# Patient Record
Sex: MALE | Employment: FULL TIME | URBAN - METROPOLITAN AREA
[De-identification: names, ages, dates, MRNs, and addresses within clinical notes are randomized per-mention and may not be internally consistent; named-entity substitution may affect disease eponyms.]

---

## 2017-02-03 ENCOUNTER — TELEPHONE (OUTPATIENT)
Dept: SLEEP MEDICINE | Age: 47
End: 2017-02-03

## 2017-02-03 NOTE — TELEPHONE ENCOUNTER
Returned call to patient, Data was reviewed. Download added to patients chart. Pressure - cmH2O Median: 7.3   95th percentile: 9.6    Maximum: 9.9    02/03/2017, 04:19 PM Settings requested by Monik Barroso       Set Mode to AutoSet   Set Min Pressure to 6.0 cmH2O   Set Max Pressure to 12.0 cmH2O   Set EPR type to Fulltime   Set EPR level to 3    Previous Settings   Set EPR level to 3   Set Mode to AutoSet   Set Min Pressure to 6.0 cmH2O   Set Max Pressure to 10.0 cmH2O   Set EPR type to Fulltime      Patient was asked to contact our office for any problems regarding his home pap usage.

## 2017-03-14 ENCOUNTER — OFFICE VISIT (OUTPATIENT)
Dept: CARDIOLOGY CLINIC | Age: 47
End: 2017-03-14

## 2017-03-14 VITALS
HEART RATE: 88 BPM | SYSTOLIC BLOOD PRESSURE: 110 MMHG | RESPIRATION RATE: 18 BRPM | BODY MASS INDEX: 35.36 KG/M2 | WEIGHT: 233.3 LBS | HEIGHT: 68 IN | OXYGEN SATURATION: 96 % | DIASTOLIC BLOOD PRESSURE: 82 MMHG

## 2017-03-14 DIAGNOSIS — M79.605 PAIN IN BOTH LOWER EXTREMITIES: ICD-10-CM

## 2017-03-14 DIAGNOSIS — Z82.49 FAMILY HISTORY OF ISCHEMIC HEART DISEASE: ICD-10-CM

## 2017-03-14 DIAGNOSIS — M79.604 PAIN IN BOTH LOWER EXTREMITIES: ICD-10-CM

## 2017-03-14 DIAGNOSIS — E78.2 MIXED HYPERLIPIDEMIA: ICD-10-CM

## 2017-03-14 DIAGNOSIS — M79.10 MYALGIA: ICD-10-CM

## 2017-03-14 DIAGNOSIS — R06.09 DOE (DYSPNEA ON EXERTION): Primary | ICD-10-CM

## 2017-03-14 DIAGNOSIS — R53.83 FATIGUE, UNSPECIFIED TYPE: ICD-10-CM

## 2017-03-14 NOTE — MR AVS SNAPSHOT
Visit Information Date & Time Provider Department Dept. Phone Encounter #  
 3/14/2017  4:15 PM Kristyn Dunbar, [address] [facility] [phone] Upcoming Health Maintenance Date Due DTaP/Tdap/Td series (1 - Tdap) 5/27/1990 INFLUENZA AGE 9 TO ADULT 8/1/2016 Allergies as of 3/14/2017  Review Complete On: 3/14/2017 By: Kristyn Dunbar MD  
 No Known Allergies Current Immunizations  Never Reviewed No immunizations on file. Not reviewed this visit You Were Diagnosed With   
  
 Codes Comments WINTERS (dyspnea on exertion)    -  Primary ICD-10-CM: R06.09 
ICD-9-CM: 786.09 Mixed hyperlipidemia     ICD-10-CM: E78.2 ICD-9-CM: 272.2 Family history of ischemic heart disease     ICD-10-CM: Z82.49 
ICD-9-CM: V17.3 Pain in both lower extremities     ICD-10-CM: M79.604, M79.605 ICD-9-CM: 729.5 Fatigue, unspecified type     ICD-10-CM: R53.83 ICD-9-CM: 780.79 Myalgia     ICD-10-CM: M79.1 ICD-9-CM: 729.1 Vitals BP Pulse Resp Height(growth percentile) Weight(growth percentile) SpO2  
 110/82 (BP 1 Location: Left arm, BP Patient Position: Sitting) 88 18 5' 8\" (1.727 m) 233 lb 4.8 oz (105.8 kg) 96% BMI Smoking Status 35.47 kg/m2 Former Smoker Vitals History BMI and BSA Data Body Mass Index Body Surface Area  
 35.47 kg/m 2 2.25 m 2 Preferred Pharmacy Pharmacy Name Phone [facility] [address] [facility], [address] [facility] AT [address] [place] [address], [facility] [phone] Your Updated Medication List  
  
   
This list is accurate as of: 3/14/17  5:12 PM.  Always use your most recent med list.  
  
  
  
  
 AFRIN EXTRA MOISTURIZING NA  
by Nasal route two (2) times a day. AMBIEN 10 mg tablet Generic drug:  zolpidem Take 5 mg by mouth nightly as needed. amoxicillin-clavulanate 875-125 mg per tablet Commonly known as:  AUGMENTIN Take 1 Tab by mouth two (2) times a day. CO Q-10 200 mg capsule Generic drug:  coenzyme Q-10 Take  by mouth daily. FISH  mg Cap Generic drug:  Omega-3 Fatty Acids Take 500 mg by mouth. JOHANN RED  
  
 ketorolac 10 mg tablet Commonly known as:  TORADOL Take 1 Tab by mouth every six (6) hours as needed for Pain. methocarbamol 500 mg tablet Commonly known as:  ROBAXIN Take 1 Tab by mouth two (2) times daily as needed for Pain. MOTRIN PO Take 600 mg by mouth as needed. rosuvastatin 20 mg tablet Commonly known as:  CRESTOR Take 1 tablet by mouth nightly. ZyrTEC 10 mg tablet Generic drug:  cetirizine Take  by mouth daily. We Performed the Following CBC WITH AUTOMATED DIFF [25940 CPT(R)] CK [id number] CPT(R)] D DIMER [id number] CPT(R)] LIPID PANEL [46444 CPT(R)] MAGNESIUM [id number] CPT(R)] METABOLIC PANEL, COMPREHENSIVE [18736 CPT(R)] SED RATE (ESR) [id number] CPT(R)] TESTOSTERONE, FREE & TOTAL [90692 CPT(R)] THYROID PANEL W/TSH [00212 CPT(R)] VITAMIN B12 & FOLATE [06197 CPT(R)] To-Do List   
 03/15/2017 Imaging:  ANKLE BRACHIAL INDEX   
  
 03/15/2017 ECHO:  ECHO TTE STRESS EXRCSE COMP W OR WO CONTR   
  
 03/15/2017 Lab:  VITAMIN D, 1, 25 DIHYDROXY   
  
 03/15/2017 Imaging:  XR CHEST PA LAT   
  
 03/16/2017 ECHO:  2D ECHO COMPLETE ADULT (TTE) W OR WO CONTR Introducing [facility]! [facility] introduces [employer] patient portal. Now you can access parts of your medical record, email your doctor's office, and request medication refills online. 1. In your internet browser, go to [email or web address] 2. Click on the First Time User? Click Here link in the Sign In box. You will see the New Member Sign Up page. 3. Enter your [employer] Access Code exactly as it appears below.  You will not need to use this code after youve completed the sign-up process. If you do not sign up before the expiration date, you must request a new code. · [employer] Access Code: [email or web address] Expires: 6/12/2017  4:26 PM 
 
4. Enter the last four digits of your Social Security Number (xxxx) and Date of Birth (mm/dd/yyyy) as indicated and click Submit. You will be taken to the next sign-up page. 5. Create a [employer] ID. This will be your [employer] login ID and cannot be changed, so think of one that is secure and easy to remember. 6. Create a [employer] password. You can change your password at any time. 7. Enter your Password Reset Question and Answer. This can be used at a later time if you forget your password. 8. Enter your e-mail address. You will receive e-mail notification when new information is available in [address] 9. Click Sign Up. You can now view and download portions of your medical record. 10. Click the Download Summary menu link to download a portable copy of your medical information. If you have questions, please visit the Frequently Asked Questions section of the [employer] website. Remember, [employer] is NOT to be used for urgent needs. For medical emergencies, dial 911. Now available from your iPhone and Android! Please provide this summary of care documentation to your next provider. Your primary care clinician is listed as [address]. If you have any questions after today's visit, please call [phone].

## 2017-03-14 NOTE — PROGRESS NOTES
Anusha Height NP  Subjective/HPI:     Sebastien Kirkpatrick is a 51 y.o. male is here for vascular consult for persistent leg ache, has been on > 1 year statin holiday without improvement. Also reporting WINTERS with minimal activities such as taking a shower and occupational activities which upwards of a few months ago had no difficulty. Denies exertional chest \"pain\" but admits to periods of chest tightness when over exerted and feeling short of breath. Denies hx of coagulopathy/pe or DVT. s    PCP Provider  Darrian Collins MD  Past Medical History:   Diagnosis Date    Chronic pain     back pain since 2011    Hyperlipemia     Unspecified sleep apnea       Past Surgical History:   Procedure Laterality Date    HX HEENT      PE Tubes    HX TONSILLECTOMY       No Known Allergies   Family History   Problem Relation Age of Onset    Heart Disease Father     Cancer Father     Diabetes Father       Current Outpatient Prescriptions   Medication Sig    IBUPROFEN (MOTRIN PO) Take 600 mg by mouth as needed.  amoxicillin-clavulanate (AUGMENTIN) 875-125 mg per tablet Take 1 Tab by mouth two (2) times a day.  ketorolac (TORADOL) 10 mg tablet Take 1 Tab by mouth every six (6) hours as needed for Pain.  methocarbamol (ROBAXIN) 500 mg tablet Take 1 Tab by mouth two (2) times daily as needed for Pain.  rosuvastatin (CRESTOR) 20 mg tablet Take 1 tablet by mouth nightly.  OXYMETAZOLINE HCL (AFRIN EXTRA MOISTURIZING NA) by Nasal route two (2) times a day.  cetirizine (ZYRTEC) 10 mg tablet Take  by mouth daily.  coenzyme Q-10 (CO Q-10) 200 mg capsule Take  by mouth daily.  Omega-3 Fatty Acids (FISH OIL) 300 mg cap Take 500 mg by mouth. JOHANN RED    zolpidem (AMBIEN) 10 mg tablet Take 5 mg by mouth nightly as needed. No current facility-administered medications for this visit.        Vitals:    03/14/17 1627   BP: 110/82   Pulse: 88   Resp: 18   SpO2: 96%   Weight: 233 lb 4.8 oz (105.8 kg)   Height: 5' 8\" (1.727 m)     Social History     Social History    Marital status:      Spouse name: N/A    Number of children: N/A    Years of education: N/A     Occupational History    Not on file. Social History Main Topics    Smoking status: Former Smoker     Quit date: 5/8/2003    Smokeless tobacco: Not on file    Alcohol use No    Drug use: No    Sexual activity: Not on file     Other Topics Concern    Not on file     Social History Narrative       I have reviewed the nurses notes, vitals, problem list, allergy list, medical history, family, social history and medications. Review of Symptoms:    General: Pt denies excessive weight gain or loss. Pt is able to conduct ADL's  HEENT: Denies blurred vision, headaches, epistaxis and difficulty swallowing. Respiratory: Denies shortness of breath, +WINTERS, no wheezing or stridor. Cardiovascular: Denies precordial pain, palpitations, edema or PND  Gastrointestinal: Denies poor appetite, indigestion, abdominal pain or blood in stool  Musculoskeletal: + myalgias no arthralgias. Neurologic: Denies tremor, paresthesias, or sensory motor disturbance  Skin: Denies rash, itching or texture change. Physical Exam:      General: Well developed, in no acute distress, cooperative and alert  HEENT: No carotid bruits, no JVD, trach is midline. Neck Supple, PEERL, EOM intact. Heart:  Normal S1/S2 negative S3 or S4. Regular, no murmur, gallop or rub.   Respiratory: Clear bilaterally x 4, no wheezing or rales  Abdomen:   Soft, non-tender, no masses, bowel sounds are active.   Extremities:  No edema, normal cap refill, no cyanosis, atraumatic. Neuro: A&Ox3, speech clear, gait stable. Skin: Skin color is normal. No rashes or lesions. Non diaphoretic  Vascular: 2+ pulses symmetric in all extremities    Cardiographics    ECG:   No results found for this or any previous visit.       Cardiology Labs:  Lab Results   Component Value Date/Time    Cholesterol, total 294 09/02/2014 10:48 AM    HDL Cholesterol 36 09/02/2014 10:48 AM    LDL, calculated 221 09/02/2014 10:48 AM    Triglyceride 184 09/02/2014 10:48 AM       Lab Results   Component Value Date/Time    Sodium 140 09/02/2014 10:48 AM    Potassium 4.9 09/02/2014 10:48 AM    Chloride 102 09/02/2014 10:48 AM    CO2 26 09/02/2014 10:48 AM    Glucose 90 09/02/2014 10:48 AM    BUN 21 09/02/2014 10:48 AM    Creatinine 0.95 09/02/2014 10:48 AM    BUN/Creatinine ratio 22 09/02/2014 10:48 AM    GFR est  09/02/2014 10:48 AM    GFR est non-AA 96 09/02/2014 10:48 AM    Calcium 9.5 09/02/2014 10:48 AM    Bilirubin, total 1.2 09/02/2014 10:48 AM    AST (SGOT) 16 09/02/2014 10:48 AM    Alk. phosphatase 65 09/02/2014 10:48 AM    Protein, total 6.6 09/02/2014 10:48 AM    Albumin 4.5 09/02/2014 10:48 AM    A-G Ratio 2.1 09/02/2014 10:48 AM    ALT (SGPT) 27 09/02/2014 10:48 AM           Assessment:     Assessment:     Lynn North was seen today for other. Diagnoses and all orders for this visit:    WINTERS (dyspnea on exertion)  -     METABOLIC PANEL, COMPREHENSIVE  -     MAGNESIUM  -     THYROID PANEL W/TSH  -     LIPID PANEL  -     CBC WITH AUTOMATED DIFF  -     VITAMIN D, 1, 25 DIHYDROXY; Future  -     VITAMIN B12 & FOLATE  -     TESTOSTERONE, FREE & TOTAL  -     CK  -     2D ECHO COMPLETE ADULT (TTE) W OR WO CONTR; Future  -     ECHO TTE STRESS EXRCSE COMP W OR WO CONTR; Future  -     XR CHEST PA LAT; Future  -     SED RATE (ESR)    Mixed hyperlipidemia  -     METABOLIC PANEL, COMPREHENSIVE  -     MAGNESIUM  -     THYROID PANEL W/TSH  -     LIPID PANEL  -     CBC WITH AUTOMATED DIFF  -     VITAMIN D, 1, 25 DIHYDROXY; Future  -     VITAMIN B12 & FOLATE  -     TESTOSTERONE, FREE & TOTAL  -     CK  -     2D ECHO COMPLETE ADULT (TTE) W OR WO CONTR; Future  -     ECHO TTE STRESS EXRCSE COMP W OR WO CONTR; Future  -     XR CHEST PA LAT;  Future  -     SED RATE (ESR)    Family history of ischemic heart disease  -     METABOLIC PANEL, COMPREHENSIVE  -     MAGNESIUM  -     THYROID PANEL W/TSH  -     LIPID PANEL  -     CBC WITH AUTOMATED DIFF  -     VITAMIN D, 1, 25 DIHYDROXY; Future  -     VITAMIN B12 & FOLATE  -     TESTOSTERONE, FREE & TOTAL  -     CK  -     2D ECHO COMPLETE ADULT (TTE) W OR WO CONTR; Future  -     ECHO TTE STRESS EXRCSE COMP W OR WO CONTR; Future  -     XR CHEST PA LAT; Future  -     SED RATE (ESR)    Pain in both lower extremities  -     METABOLIC PANEL, COMPREHENSIVE  -     MAGNESIUM  -     THYROID PANEL W/TSH  -     LIPID PANEL  -     CBC WITH AUTOMATED DIFF  -     VITAMIN D, 1, 25 DIHYDROXY; Future  -     VITAMIN B12 & FOLATE  -     TESTOSTERONE, FREE & TOTAL  -     CK  -     2D ECHO COMPLETE ADULT (TTE) W OR WO CONTR; Future  -     ECHO TTE STRESS EXRCSE COMP W OR WO CONTR; Future  -     XR CHEST PA LAT; Future  -     SED RATE (ESR)    Fatigue, unspecified type  -     METABOLIC PANEL, COMPREHENSIVE  -     MAGNESIUM  -     THYROID PANEL W/TSH  -     LIPID PANEL  -     CBC WITH AUTOMATED DIFF  -     VITAMIN D, 1, 25 DIHYDROXY; Future  -     VITAMIN B12 & FOLATE  -     TESTOSTERONE, FREE & TOTAL  -     CK  -     2D ECHO COMPLETE ADULT (TTE) W OR WO CONTR; Future  -     ECHO TTE STRESS EXRCSE COMP W OR WO CONTR; Future  -     XR CHEST PA LAT; Future  -     SED RATE (ESR)    Myalgia        ICD-10-CM ICD-9-CM    1. WINTERS (dyspnea on exertion) U75.01 908.36 METABOLIC PANEL, COMPREHENSIVE      MAGNESIUM      THYROID PANEL W/TSH      LIPID PANEL      CBC WITH AUTOMATED DIFF      VITAMIN D, 1, 25 DIHYDROXY      VITAMIN B12 & FOLATE      TESTOSTERONE, FREE & TOTAL      CK      2D ECHO COMPLETE ADULT (TTE) W OR WO CONTR      ECHO TTE STRESS EXRCSE COMP W OR WO CONTR      XR CHEST PA LAT      SED RATE (ESR)   2.  Mixed hyperlipidemia F26.3 897.5 METABOLIC PANEL, COMPREHENSIVE      MAGNESIUM      THYROID PANEL W/TSH      LIPID PANEL      CBC WITH AUTOMATED DIFF      VITAMIN D, 1, 25 DIHYDROXY      VITAMIN B12 & FOLATE      TESTOSTERONE, FREE & TOTAL      CK      2D ECHO COMPLETE ADULT (TTE) W OR WO CONTR      ECHO TTE STRESS EXRCSE COMP W OR WO CONTR      XR CHEST PA LAT      SED RATE (ESR)   3. Family history of ischemic heart disease C23.78 L12.1 METABOLIC PANEL, COMPREHENSIVE      MAGNESIUM      THYROID PANEL W/TSH      LIPID PANEL      CBC WITH AUTOMATED DIFF      VITAMIN D, 1, 25 DIHYDROXY      VITAMIN B12 & FOLATE      TESTOSTERONE, FREE & TOTAL      CK      2D ECHO COMPLETE ADULT (TTE) W OR WO CONTR      ECHO TTE STRESS EXRCSE COMP W OR WO CONTR      XR CHEST PA LAT      SED RATE (ESR)   4. Pain in both lower extremities Z72.891 126.4 METABOLIC PANEL, COMPREHENSIVE    M79.605  MAGNESIUM      THYROID PANEL W/TSH      LIPID PANEL      CBC WITH AUTOMATED DIFF      VITAMIN D, 1, 25 DIHYDROXY      VITAMIN B12 & FOLATE      TESTOSTERONE, FREE & TOTAL      CK      2D ECHO COMPLETE ADULT (TTE) W OR WO CONTR      ECHO TTE STRESS EXRCSE COMP W OR WO CONTR      XR CHEST PA LAT      SED RATE (ESR)   5. Fatigue, unspecified type L39.28 479.23 METABOLIC PANEL, COMPREHENSIVE      MAGNESIUM      THYROID PANEL W/TSH      LIPID PANEL      CBC WITH AUTOMATED DIFF      VITAMIN D, 1, 25 DIHYDROXY      VITAMIN B12 & FOLATE      TESTOSTERONE, FREE & TOTAL      CK      2D ECHO COMPLETE ADULT (TTE) W OR WO CONTR      ECHO TTE STRESS EXRCSE COMP W OR WO CONTR      XR CHEST PA LAT      SED RATE (ESR)   6. Myalgia M79.1 729.1      Orders Placed This Encounter    XR CHEST PA LAT     Standing Status:   Future     Standing Expiration Date:   4/14/2018     Order Specific Question:   Reason for Exam     Answer:   WINTERS     Order Specific Question:   Is Patient Allergic to Contrast Dye?      Answer:   Unknown    METABOLIC PANEL, COMPREHENSIVE    MAGNESIUM    THYROID PANEL W/TSH    LIPID PANEL    CBC WITH AUTOMATED DIFF    VITAMIN D, 1, 25 DIHYDROXY     Standing Status:   Future     Standing Expiration Date:   6/15/2017    VITAMIN B12 & FOLATE    TESTOSTERONE, FREE & TOTAL    CK    SED RATE (ESR)    2D ECHO COMPLETE ADULT (TTE) W OR WO CONTR     Standing Status:   Future     Standing Expiration Date:   9/14/2017     Order Specific Question:   Reason for Exam:     Answer:   WINTERS     Order Specific Question:   Contrast Enhancement (Bubble Study, Definity, Optison) may be used if criteria listed in established evidence-based protocol has been identified. Answer: Yes    ECHO TTE STRESS EXRCSE COMP W OR WO CONTR     Standing Status:   Future     Standing Expiration Date:   9/14/2017     Order Specific Question:   Reason for Exam:     Answer:   WINTERS and fatigue     Order Specific Question:   Contrast Enhancement (Bubble Study, Definity, Optison) may be used if criteria listed in established evidence-based protocol has been identified. Answer:   Yes        Plan:     GENERAL CARDIOLOGY: Patient presents with progressive WINTERS, fatigue and diffuse myalgias. CAD risk factors include Male/High chol/FMH. Will evaluate with ECHO and Stress echo. CXR as hx of HVAC employment. Follow up with Dr Kiran Rice. Sed rate to evaluate for inflammatory / vasculitis pathology. Fatigue: CBC, CMP, thyroid, and T levels. With WINTERS there is a low WELLS criteria, D dimer r/o embolism. Follow up with Dr Kiran Rice when testing complete. VASCULAR: Low suspect for PAD, ERIBERTO to evaluate. If negative no need for vascular follow up. Amy Delatorre, DORI       Pt seen and examined in details. Agree with NP A&P. D/w pt in details.      Kasi Canada MD

## 2017-03-15 DIAGNOSIS — R06.09 DOE (DYSPNEA ON EXERTION): ICD-10-CM

## 2017-03-15 DIAGNOSIS — R53.83 FATIGUE, UNSPECIFIED TYPE: ICD-10-CM

## 2017-03-15 DIAGNOSIS — E78.2 MIXED HYPERLIPIDEMIA: ICD-10-CM

## 2017-03-15 DIAGNOSIS — M79.604 PAIN IN BOTH LOWER EXTREMITIES: ICD-10-CM

## 2017-03-15 DIAGNOSIS — M79.605 PAIN IN BOTH LOWER EXTREMITIES: ICD-10-CM

## 2017-03-15 DIAGNOSIS — Z82.49 FAMILY HISTORY OF ISCHEMIC HEART DISEASE: ICD-10-CM

## 2017-03-27 ENCOUNTER — CLINICAL SUPPORT (OUTPATIENT)
Dept: CARDIOLOGY CLINIC | Age: 47
End: 2017-03-27

## 2017-03-27 VITALS — HEIGHT: 75 IN

## 2017-03-27 DIAGNOSIS — E78.2 MIXED HYPERLIPIDEMIA: ICD-10-CM

## 2017-03-27 DIAGNOSIS — R53.83 FATIGUE, UNSPECIFIED TYPE: ICD-10-CM

## 2017-03-27 DIAGNOSIS — R06.09 DOE (DYSPNEA ON EXERTION): ICD-10-CM

## 2017-03-27 DIAGNOSIS — M79.604 PAIN IN BOTH LOWER EXTREMITIES: ICD-10-CM

## 2017-03-27 DIAGNOSIS — Z82.49 FAMILY HISTORY OF ISCHEMIC HEART DISEASE: ICD-10-CM

## 2017-03-27 DIAGNOSIS — M79.605 PAIN IN BOTH LOWER EXTREMITIES: ICD-10-CM

## 2017-03-28 ENCOUNTER — TELEPHONE (OUTPATIENT)
Dept: CARDIOLOGY CLINIC | Age: 47
End: 2017-03-28

## 2017-03-28 ENCOUNTER — CLINICAL SUPPORT (OUTPATIENT)
Dept: CARDIOLOGY CLINIC | Age: 47
End: 2017-03-28

## 2017-03-28 DIAGNOSIS — R06.09 DOE (DYSPNEA ON EXERTION): ICD-10-CM

## 2017-03-28 DIAGNOSIS — Z82.49 FAMILY HISTORY OF ISCHEMIC HEART DISEASE: ICD-10-CM

## 2017-03-28 DIAGNOSIS — M79.604 PAIN IN BOTH LOWER EXTREMITIES: ICD-10-CM

## 2017-03-28 DIAGNOSIS — E78.2 MIXED HYPERLIPIDEMIA: ICD-10-CM

## 2017-03-28 DIAGNOSIS — R53.83 FATIGUE, UNSPECIFIED TYPE: ICD-10-CM

## 2017-03-28 DIAGNOSIS — M79.605 PAIN IN BOTH LOWER EXTREMITIES: ICD-10-CM

## 2017-03-28 NOTE — TELEPHONE ENCOUNTER
----- Message from Bailey Flood NP sent at 3/28/2017  4:25 PM EDT -----  Isabelle Suwanee: Please call patient, stress echo was normal.  I am still waiting for his lab results.

## 2017-03-28 NOTE — PROGRESS NOTES
Chauncey Delgado: Please call patient, stress echo was normal.  I am still waiting for his lab results.

## 2017-03-29 NOTE — TELEPHONE ENCOUNTER
----- Message from Pam Marie NP sent at 3/28/2017  4:25 PM EDT -----  Gm: Please call patient, stress echo was normal.  I am still waiting for his lab results.

## 2017-03-30 ENCOUNTER — TELEPHONE (OUTPATIENT)
Dept: CARDIOLOGY CLINIC | Age: 47
End: 2017-03-30

## 2017-03-30 NOTE — PROGRESS NOTES
uDlce Maria Guardian, please call patient echocardiogram finds very minimal leaking valves, normal pumping range of heart. Follow-up with Dr. Jennifer Allen as planned.

## 2017-03-31 ENCOUNTER — TELEPHONE (OUTPATIENT)
Dept: CARDIOLOGY CLINIC | Age: 47
End: 2017-03-31

## 2017-03-31 NOTE — TELEPHONE ENCOUNTER
----- Message from ditloDORI sent at 3/30/2017  4:58 PM EDT -----  Adelaida Francois, please call patient echocardiogram finds very minimal leaking valves, normal pumping range of heart. Follow-up with Dr. Kelly Edwards as planned.

## 2017-04-03 NOTE — TELEPHONE ENCOUNTER
Verified patient with two identifiers. Spoke with patient regarding ECHO test results. Meme Meyers, Pt needs an appt for ERIBERTO and a follow appt with Dr. Mae Woodson.   Thanks

## 2017-04-19 LAB
25(OH)D3+25(OH)D2 SERPL-MCNC: 26.3 NG/ML (ref 30–100)
D DIMER PPP FEU-MCNC: 0.3 MG/L FEU (ref 0–0.49)

## 2017-04-20 ENCOUNTER — TELEPHONE (OUTPATIENT)
Dept: CARDIOLOGY CLINIC | Age: 47
End: 2017-04-20

## 2017-04-20 DIAGNOSIS — E78.2 MIXED HYPERLIPIDEMIA: Primary | ICD-10-CM

## 2017-04-20 DIAGNOSIS — Z82.49 FAMILY HISTORY OF ISCHEMIC HEART DISEASE: ICD-10-CM

## 2017-04-20 LAB
ALBUMIN SERPL-MCNC: 4.3 G/DL (ref 3.5–5.5)
ALBUMIN/GLOB SERPL: 1.7 {RATIO} (ref 1.2–2.2)
ALP SERPL-CCNC: 68 IU/L (ref 39–117)
ALT SERPL-CCNC: 21 IU/L (ref 0–44)
AST SERPL-CCNC: 17 IU/L (ref 0–40)
BASOPHILS # BLD AUTO: 0 X10E3/UL (ref 0–0.2)
BASOPHILS NFR BLD AUTO: 1 %
BILIRUB SERPL-MCNC: 0.6 MG/DL (ref 0–1.2)
BUN SERPL-MCNC: 18 MG/DL (ref 6–24)
BUN/CREAT SERPL: 18 (ref 9–20)
CALCIUM SERPL-MCNC: 9.5 MG/DL (ref 8.7–10.2)
CHLORIDE SERPL-SCNC: 100 MMOL/L (ref 96–106)
CHOLEST SERPL-MCNC: 313 MG/DL (ref 100–199)
CK SERPL-CCNC: 160 U/L (ref 24–204)
CO2 SERPL-SCNC: 25 MMOL/L (ref 18–29)
COMMENT: NORMAL
CREAT SERPL-MCNC: 1.01 MG/DL (ref 0.76–1.27)
EOSINOPHIL # BLD AUTO: 0.1 X10E3/UL (ref 0–0.4)
EOSINOPHIL NFR BLD AUTO: 3 %
ERYTHROCYTE [DISTWIDTH] IN BLOOD BY AUTOMATED COUNT: 13.4 % (ref 12.3–15.4)
ERYTHROCYTE [SEDIMENTATION RATE] IN BLOOD BY WESTERGREN METHOD: 2 MM/HR (ref 0–15)
FOLATE SERPL-MCNC: 14.2 NG/ML
FT4I SERPL CALC-MCNC: 2.4 (ref 1.2–4.9)
GLOBULIN SER CALC-MCNC: 2.6 G/DL (ref 1.5–4.5)
GLUCOSE SERPL-MCNC: 101 MG/DL (ref 65–99)
HCT VFR BLD AUTO: 42.9 % (ref 37.5–51)
HDLC SERPL-MCNC: 30 MG/DL
HGB BLD-MCNC: 14.8 G/DL (ref 12.6–17.7)
IMM GRANULOCYTES # BLD: 0 X10E3/UL (ref 0–0.1)
IMM GRANULOCYTES NFR BLD: 0 %
INTERPRETATION, 910389: NORMAL
LDLC SERPL CALC-MCNC: ABNORMAL MG/DL (ref 0–99)
LYMPHOCYTES # BLD AUTO: 1.8 X10E3/UL (ref 0.7–3.1)
LYMPHOCYTES NFR BLD AUTO: 34 %
MAGNESIUM SERPL-MCNC: 1.8 MG/DL (ref 1.6–2.3)
MCH RBC QN AUTO: 32.3 PG (ref 26.6–33)
MCHC RBC AUTO-ENTMCNC: 34.5 G/DL (ref 31.5–35.7)
MCV RBC AUTO: 94 FL (ref 79–97)
MONOCYTES # BLD AUTO: 0.5 X10E3/UL (ref 0.1–0.9)
MONOCYTES NFR BLD AUTO: 9 %
NEUTROPHILS # BLD AUTO: 2.9 X10E3/UL (ref 1.4–7)
NEUTROPHILS NFR BLD AUTO: 53 %
PLATELET # BLD AUTO: 251 X10E3/UL (ref 150–379)
POTASSIUM SERPL-SCNC: 4.5 MMOL/L (ref 3.5–5.2)
PROT SERPL-MCNC: 6.9 G/DL (ref 6–8.5)
RBC # BLD AUTO: 4.58 X10E6/UL (ref 4.14–5.8)
SODIUM SERPL-SCNC: 139 MMOL/L (ref 134–144)
T3RU NFR SERPL: 31 % (ref 24–39)
T4 SERPL-MCNC: 7.8 UG/DL (ref 4.5–12)
TESTOST FREE SERPL-MCNC: 12.5 PG/ML (ref 6.8–21.5)
TESTOST SERPL-MCNC: 435 NG/DL (ref 348–1197)
TRIGL SERPL-MCNC: 409 MG/DL (ref 0–149)
TSH SERPL DL<=0.005 MIU/L-ACNC: 2.88 UIU/ML (ref 0.45–4.5)
VIT B12 SERPL-MCNC: 508 PG/ML (ref 211–946)
VLDLC SERPL CALC-MCNC: ABNORMAL MG/DL (ref 5–40)
WBC # BLD AUTO: 5.4 X10E3/UL (ref 3.4–10.8)

## 2017-04-20 NOTE — PROGRESS NOTES
Spoke with patient's wife Shanna(verified HIPPA)  Verified patient with 2 patient identifier  Informed per Jose Justice NP labs OK other than lipid panel. Wife confirmed  hasn't taken Crestor in a year. Informed Dilip NP would like him to resume Crestor.  Start with 20mg x 1 month then increase to 40mg then after. Repeat CMP, CK, Lipids and direct LDL in 2 Months.  I anticipate this patient will require PSK9 therapy. Wife says  would like to try red yeast rice and OTC crestoff.  Says will have labs done in 2 months

## 2017-04-20 NOTE — PROGRESS NOTES
Please call patient labs OK other than lipid panel. He has been on a statin holiday, I would like him to resume Crestor. Start with 20mg x 1 month then increase to 40mg then after. Repeat CMP, CK, Lipids and direct LDL in 2 Months.   I anticipate this patient will require PSK9 therapy

## 2017-04-20 NOTE — TELEPHONE ENCOUNTER
Spoke with patient's wife Shanna(verified HIPPA)  Verified patient with 2 patient identifier  Informed per Raul Bates NP labs OK other than lipid panel. Wife confirmed  hasn't taken Crestor in a year. Informed Dilip MEADOWS would like him to resume Crestor.  Start with 20mg x 1 month then increase to 40mg then after. Repeat CMP, CK, Lipids and direct LDL in 2 Months.  I anticipate this patient will require PSK9 therapy. Wife says  would like to try red yeast rice and OTC crestoff.  Says will have labs done in 2 months

## 2017-04-20 NOTE — PROGRESS NOTES
Spoke with patient's wife Shanna(verified HIPPA)  Verified patient with 2 patient identifier  Informed per Montel Kayser NP VIT d came as a separate result, it is low. Start OTC VitD3 2000units oral daily.  ( d3 is the liquid gel capsul which has better absorption)

## 2017-04-20 NOTE — TELEPHONE ENCOUNTER
----- Message from Thi Hernandez NP sent at 4/20/2017  8:59 AM EDT -----  Please call patient labs OK other than lipid panel. He has been on a statin holiday, I would like him to resume Crestor. Start with 20mg x 1 month then increase to 40mg then after. Repeat CMP, CK, Lipids and direct LDL in 2 Months.   I anticipate this patient will require PSK9 therapy

## 2017-04-20 NOTE — PROGRESS NOTES
VIT d came as a separate result, it is low. Start OTC VitD3 2000units oral daily.  ( d3 is the liquid gel capsul which has better absorption)

## 2017-04-26 ENCOUNTER — TELEPHONE (OUTPATIENT)
Dept: SLEEP MEDICINE | Age: 47
End: 2017-04-26

## 2017-04-26 DIAGNOSIS — G47.33 OSA (OBSTRUCTIVE SLEEP APNEA): Primary | ICD-10-CM

## 2017-04-26 NOTE — TELEPHONE ENCOUNTER
Orders Placed This Encounter    AMB SUPPLY ORDER     Diagnosis: (G47.33) ABUNDIO (obstructive sleep apnea)  (primary encounter diagnosis)      Oral/Nasal Combo Mask 1 every 3 months.  Oral Cushion Combo Mask (Replace) 2 per month.  Nasal Pillows Combo Mask (Replace) 2 per month.  Full Face Mask 1 every 3 months.  Full Face Mask Cushion 1 per month.  Nasal Cushion (Replace) 2 per month.  Nasal Pillows (Replace) 2 per month.  Nasal Interface Mask 1 every 3 months.  Headgear 1 every 6 months.  Chinstrap 1 every 6 months.  Tubing 1 every 3 months.  Filter(s) Disposable 2 per month.  Filter(s) Non-Disposable 1 every 6 months.  Oral Interface 1 every 3 months. [facility] for [employer] (Replace) 1 every 6 months.  Tubing with heating element 1 every 3 months. Perform Mask Fitting per patient preference and comfort - replace as above. Rhonda Hardin MD, FAA; NPI: [id number]    Electronically signed. Date:- 4-26-17.

## 2017-04-26 NOTE — TELEPHONE ENCOUNTER
Patient called requesting a Rx for new PAP supplies. His mask has broken and he needs a new tube. He was last sen in 2015.  He has scheduled a f/u for the next available appt, which is May 24th

## 2017-05-31 ENCOUNTER — OFFICE VISIT (OUTPATIENT)
Dept: SLEEP MEDICINE | Age: 47
End: 2017-05-31

## 2017-05-31 ENCOUNTER — DOCUMENTATION ONLY (OUTPATIENT)
Dept: SLEEP MEDICINE | Age: 47
End: 2017-05-31

## 2017-05-31 VITALS
WEIGHT: 235 LBS | HEART RATE: 81 BPM | BODY MASS INDEX: 29.22 KG/M2 | SYSTOLIC BLOOD PRESSURE: 121 MMHG | OXYGEN SATURATION: 97 % | TEMPERATURE: 97.6 F | HEIGHT: 75 IN | DIASTOLIC BLOOD PRESSURE: 74 MMHG

## 2017-05-31 DIAGNOSIS — G47.33 OSA (OBSTRUCTIVE SLEEP APNEA): Primary | ICD-10-CM

## 2017-05-31 NOTE — PROGRESS NOTES
PAP supply order and clinical notes were faxed to [facility] on 5/31/17

## 2017-05-31 NOTE — PROGRESS NOTES
217 [facility]. [place], [address]  Tel.  [phone]  Fax. 100 [facility] 60  [place], [address] [facility]  Tel.  [phone]  Fax. [phone] [facility]4 [place] [address] [address]   Tel.  [phone]  Fax. [phone]     S>Rebel Gann is a 49 y.o. male seen for a positive airway pressure follow-up. He reports no problems using the device. The following problems are identified:    Drowsiness no Problems exhaling no   Snoring no Forget to put on no   Mask Comfortable yes Can't fall asleep no   Dry Mouth no Mask falls off no   Air Leaking no Frequent awakenings yes       He admits that his sleep has improved. Therapy Apnea Index averaged over PAP use: 2.0 /hr which reflects improved sleep breathing condition. He reports that he feels that he may not be getting enough pressure and tends to wake up frequently through the night. Typically the awakening occur at 2 am, 4 am and then at 5 or 6 am.  He uses the restroom following the awakening. He feels his mind is awake while he sleeps. No Known Allergies    He has a current medication list which includes the following prescription(s): methocarbamol, oxymetazoline hcl, coenzyme q-10, omega-3 fatty acids, amoxicillin-clavulanate, ketorolac, rosuvastatin, ibuprofen, cetirizine, and zolpidem. Anca Line He  has a past medical history of Chronic pain; Hyperlipemia; and Unspecified sleep apnea. [place] Sleepiness Score: 6   and Modified F.O.S.Q. Score Total / 2: 16   which reflect improved sleep quality over therapy time. O>    Visit Vitals    /74    Pulse 81    Temp 97.6 °F (36.4 °C)    Ht 6' 3\" (1.905 m)    Wt 235 lb (106.6 kg)    SpO2 97%    BMI 29.37 kg/m2           General:   Alert, oriented, not in distress   Neck:   No JVD    Chest/Lungs:  symetrical lung expansion , no accessory muscle use    Extremities:  no obvious rashes , negative edema    Neuro:  No focal deficits ;  No obvious tremor Psych:  Normal affect ,  Normal countenance ;           A>    ICD-10-CM ICD-9-CM    1. ABUNDIO (obstructive sleep apnea) G47.33 327.23 SLEEP LAB (PAP TITRATION)   2. BMI 29.0-29.9,adult Z68.29 V85.25      AHI = 7.1. On CPAP : 6-12 cmH2O. Compliant:      yes    Therapeutic Response:  Negative    P>  * Device pressure change to CPAP  10 cmH2O. * Titration ordered to assess nocturnal awakening and optimal pressures. * We have recommended a dedicated weight loss and exercise regiment as significant weight reduction has been shown to reduce severity of obstructive sleep apnea. * Follow-up Disposition:  Return in about 1 year (around 5/31/2018), or if symptoms worsen or fail to improve. * He was asked to contact our office for any problems regarding PAP therapy. * Counseling was provided regarding the importance of regular PAP use and on proper sleep hygiene and safe driving. * Re-enforced proper and regular cleaning for the device. Thank you for allowing us to participate in your patient's medical care.

## 2017-05-31 NOTE — PATIENT INSTRUCTIONS
217 [facility]., [place]. [place], [address]  Tel.  [phone]  Fax. 100 [facility] 60  [place], [address] [facility]  Tel.  [phone]  Fax. [phone] [address]  Tel.  [phone]  Fax. [phone]     Learning About CPAP for Sleep Apnea  What is CPAP? CPAP is a small machine that you use at home every night while you sleep. It increases air pressure in your throat to keep your airway open. When you have sleep apnea, this can help you sleep better so you feel much better. CPAP stands for \"continuous positive airway pressure. \"  The CPAP machine will have one of the following:  · A mask that covers your nose and mouth  · Prongs that fit into your nose  · A mask that covers your nose only, the most common type. This type is called NCPAP. The N stands for \"nasal.\"  Why is it done? CPAP is usually the best treatment for obstructive sleep apnea. It is the first treatment choice and the most widely used. Your doctor may suggest CPAP if you have:  · Moderate to severe sleep apnea. · Sleep apnea and coronary artery disease (CAD) or heart failure. How does it help? · CPAP can help you have more normal sleep, so you feel less sleepy and more alert during the daytime. · CPAP may help keep heart failure or other heart problems from getting worse. · NCPAP may help lower your blood pressure. · If you use CPAP, your bed partner may also sleep better because you are not snoring or restless. What are the side effects? Some people who use CPAP have:  · A dry or stuffy nose and a sore throat. · Irritated skin on the face. · Sore eyes. · Bloating. If you have any of these problems, work with your doctor to fix them. Here are some things you can try:  · Be sure the mask or nasal prongs fit well. · See if your doctor can adjust the pressure of your CPAP. · If your nose is dry, try a humidifier.   · If your nose is runny or stuffy, try decongestant medicine or a steroid nasal spray. If these things do not help, you might try a different type of machine. Some machines have air pressure that adjusts on its own. Others have air pressures that are different when you breathe in than when you breathe out. This may reduce discomfort caused by too much pressure in your nose. Where can you learn more? Go to [employer]be  Enter Nithya Janice in the search box to learn more about \"Learning About CPAP for Sleep Apnea. \"   © 8237-1860 Healthwise, Incorporated. Care instructions adapted under license by [employer] (which disclaims liability or warranty for this information). This care instruction is for use with your licensed healthcare professional. If you have questions about a medical condition or this instruction, always ask your healthcare professional. [place] [address] any warranty or liability for your use of this information. Content Version: 3.7.95170; Last Revised: January 11, 2010  PROPER SLEEP HYGIENE    What to avoid  · Do not have drinks with caffeine, such as coffee or black tea, for 8 hours before bed. · Do not smoke or use other types of tobacco near bedtime. Nicotine is a stimulant and can keep you awake. · Avoid drinking alcohol late in the evening, because it can cause you to wake in the middle of the night. · Do not eat a big meal close to bedtime. If you are hungry, eat a light snack. · Do not drink a lot of water close to bedtime, because the need to urinate may wake you up during the night. · Do not read or watch TV in bed. Use the bed only for sleeping and sexual activity. What to try  · Go to bed at the same time every night, and wake up at the same time every morning. Do not take naps during the day. · Keep your bedroom quiet, dark, and cool. · Get regular exercise, but not within 3 to 4 hours of your bedtime. .  · Sleep on a comfortable pillow and mattress.   · If watching the clock makes you anxious, turn it facing away from you so you cannot see the time. · If you worry when you lie down, start a worry book. Well before bedtime, write down your worries, and then set the book and your concerns aside. · Try meditation or other relaxation techniques before you go to bed. · If you cannot fall asleep, get up and go to another room until you feel sleepy. Do something relaxing. Repeat your bedtime routine before you go to bed again. · Make your house quiet and calm about an hour before bedtime. Turn down the lights, turn off the TV, log off the computer, and turn down the volume on music. This can help you relax after a busy day. Drowsy Driving: The [employer] cites drowsiness as a causing factor in more than 461,484 police reported crashes annually, resulting in 76,000 injuries and 1,500 deaths. Other surveys suggest 55% of people polled have driven while drowsy in the past year, 23% had fallen asleep but not crashed, 3% crashed, and 2% had and accident due to drowsy driving. Who is at risk? Young Drivers: One study of drowsy driving accidents states that 55% of the drivers were under 25 years. Of those, 75% were male. Shift Workers and Travelers: People who work overnight or travel across time zones frequently are at higher risk of experiencing Circadian Rhythm Disorders. They are trying to work and function when their body is programed to sleep. Sleep Deprived: Lack of sleep has a serious impact on your ability to pay attention or focus on a task. Consistently getting less than the average of 8 hours your body needs creates partial or cumulative sleep deprivation. Untreated Sleep Disorders: Sleep Apnea, Narcolepsy, R.L.S., and other sleep disorders (untreated) prevent a person from getting enough restful sleep. This leads to excessive daytime sleepiness and increases the risk for drowsy driving accidents by up to 7 times.   Medications / Alcohol: Even over the counter medications can cause drowsiness. Medications that impair a drivers attention should have a warning label. Alcohol naturally makes you sleepy and on its own can cause accidents. Combined with excessive drowsiness its effects are amplified. Signs of Drowsy Driving:   * You don't remember driving the last few miles   * You may drift out of your cristhian   * You are unable to focus and your thoughts wander   * You may yawn more often than normal   * You have difficulty keeping your eyes open / nodding off   * Missing traffic signs, speeding, or tailgating  Prevention-   Good sleep hygiene, lifestyle and behavioral choices have the most impact on drowsy driving. There is no substitute for sleep and the average person requires 8 hours nightly. If you find yourself driving drowsy, stop and sleep. Consider the sleep hygiene tips provided during your visit as well. Medication Refill Policy: Refills for all medications require 1 week advance notice. Please have your pharmacy fax a refill request. We are unable to fax, or call in \"controled substance\" medications and you will need to pick these prescriptions up from our office. OPENLANE Activation    Thank you for requesting access to OPENLANE. Please follow the instructions below to securely access and download your online medical record. OPENLANE allows you to send messages to your doctor, view your test results, renew your prescriptions, schedule appointments, and more. How Do I Sign Up? 1. In your internet browser, go to [email or web address]. 2. Click on the First Time User? Click Here link in the Sign In box. You will see the New Member Sign Up page. 3. Enter your OPENLANE Access Code exactly as it appears below. You will not need to use this code after youve completed the sign-up process. If you do not sign up before the expiration date, you must request a new code.     OPENLANE Access Code: [email or web address]  Expires: 2017  4:26 PM (This is the date your VertiFlex access code will )    4. Enter the last four digits of your Social Security Number (xxxx) and Date of Birth (mm/dd/yyyy) as indicated and click Submit. You will be taken to the next sign-up page. 5. Create a VertiFlex ID. This will be your VertiFlex login ID and cannot be changed, so think of one that is secure and easy to remember. 6. Create a VertiFlex password. You can change your password at any time. 7. Enter your Password Reset Question and Answer. This can be used at a later time if you forget your password. 8. Enter your e-mail address. You will receive e-mail notification when new information is available in [address] 9. Click Sign Up. You can now view and download portions of your medical record. 10. Click the Download Summary menu link to download a portable copy of your medical information. Additional Information    If you have questions, please call [phone]. Remember, VertiFlex is NOT to be used for urgent needs. For medical emergencies, dial 911.

## 2017-06-20 DIAGNOSIS — Z82.49 FAMILY HISTORY OF ISCHEMIC HEART DISEASE: ICD-10-CM

## 2017-06-20 DIAGNOSIS — E78.2 MIXED HYPERLIPIDEMIA: ICD-10-CM

## 2018-02-22 ENCOUNTER — TELEPHONE (OUTPATIENT)
Dept: SLEEP MEDICINE | Age: 48
End: 2018-02-22

## 2018-02-22 NOTE — TELEPHONE ENCOUNTER
Pressure does not appear to have been adjusted during last office visit (10/31/17). Settings adjusted remotely to CPAP 10cm per MD order at last office visit. Advised patients wife to contact the sleep center with any further complications with patients PAP therapy.

## 2018-02-22 NOTE — TELEPHONE ENCOUNTER
Patients wife called (on HIPAA) stating during his last OV his pressure was suppose to be increased to 10 and she wanted to make sure this was current setting.  She can be reached at [phone]

## 2018-08-28 ENCOUNTER — OFFICE VISIT (OUTPATIENT)
Dept: CARDIOLOGY CLINIC | Age: 48
End: 2018-08-28

## 2018-08-28 VITALS
OXYGEN SATURATION: 96 % | HEIGHT: 75 IN | BODY MASS INDEX: 28.03 KG/M2 | DIASTOLIC BLOOD PRESSURE: 70 MMHG | HEART RATE: 61 BPM | WEIGHT: 225.4 LBS | SYSTOLIC BLOOD PRESSURE: 120 MMHG | RESPIRATION RATE: 18 BRPM

## 2018-08-28 DIAGNOSIS — R00.2 INTERMITTENT PALPITATIONS: Primary | ICD-10-CM

## 2018-08-28 DIAGNOSIS — R06.09 DOE (DYSPNEA ON EXERTION): ICD-10-CM

## 2018-08-28 DIAGNOSIS — M79.605 PAIN IN BOTH LOWER EXTREMITIES: ICD-10-CM

## 2018-08-28 DIAGNOSIS — M79.604 PAIN IN BOTH LOWER EXTREMITIES: ICD-10-CM

## 2018-08-28 DIAGNOSIS — E78.2 MIXED HYPERLIPIDEMIA: ICD-10-CM

## 2018-08-28 DIAGNOSIS — Z82.49 FAMILY HISTORY OF ISCHEMIC HEART DISEASE: ICD-10-CM

## 2018-08-28 DIAGNOSIS — E78.49 FAMILIAL HYPERLIPIDEMIA, HIGH LDL: ICD-10-CM

## 2018-08-28 RX ORDER — VITAMIN E 268 MG
CAPSULE ORAL DAILY
COMMUNITY

## 2018-08-28 RX ORDER — LANOLIN ALCOHOL/MO/W.PET/CERES
1000 CREAM (GRAM) TOPICAL DAILY
COMMUNITY

## 2018-08-28 RX ORDER — AMPICILLIN TRIHYDRATE 250 MG
1200 CAPSULE ORAL DAILY
COMMUNITY

## 2018-08-28 RX ORDER — GLUCOSAMINE HCL 500 MG
5000 TABLET ORAL DAILY
COMMUNITY

## 2018-08-28 RX ORDER — ASPIRIN 81 MG/1
TABLET ORAL DAILY
COMMUNITY

## 2018-08-28 NOTE — PROGRESS NOTES
Kwabena Fagan DNP, ANP-BC Subjective/HPI:  
 
Jolly Cool is a 51 y.o. male is here for long follow-up visit and symptom based appointment. Patient reports during certain physical activities including sex he develops significant fatigue, elevated heart rate and at times a headache. He is also been reporting a pressure sensation behind his eyes, he has been seen by ophthalmology, glaucoma test was performed and per patient's wife readings were normal.  He also reports persistent leg pain exacerbated with walking previous use of statin therapy he has been intolerant to includes simvastatin and Crestor. Trying to control his cholesterol which seems to be more familial hyperlipidemia he was trialing red yeast rice and cholest-off but was intermittent and his therapy. He also reports feelings of dyspnea more so after eating and at times with resting, previous workup included a negative d-dimer and normal CBC. 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 There is a strong family history of coronary artery disease PCP Provider Will Walton MD 
Past Medical History:  
Diagnosis Date  Chronic pain   
 back pain since 2011  Hyperlipemia  Unspecified sleep apnea Past Surgical History:  
Procedure Laterality Date  HX HEENT    
 PE Tubes  HX TONSILLECTOMY No Known Allergies Family History Problem Relation Age of Onset  Heart Disease Father  Cancer Father  Diabetes Father Current Outpatient Prescriptions Medication Sig  Cholecalciferol, Vitamin D3, 3,000 unit tab Take 5,000 Units by mouth daily.  vitamin E (AQUA GEMS) 400 unit capsule Take  by mouth daily.  cyanocobalamin 1,000 mcg tablet Take 1,000 mcg by mouth daily.  OTHER Take 1 Tab by mouth daily. Indications: NATURE MADE CHOLESTOFF PLUS  
 aspirin delayed-release 81 mg tablet Take  by mouth daily.  red yeast rice extract 600 mg cap Take 1,200 mg by mouth daily.  ASHWAGANDHA ROOT EXTRACT,BULK, Take 183 mg by mouth two (2) times a day.  multivitamin-min-iron-FA-vit K (ADULTS MULTIVITAMIN) 18 mg iron-400 mcg-25 mcg tab Take  by mouth.  IBUPROFEN (MOTRIN PO) Take 600 mg by mouth as needed.  Omega-3 Fatty Acids (FISH OIL) 300 mg cap Take 500 mg by mouth. JOHANN RED  ketorolac (TORADOL) 10 mg tablet Take 1 Tab by mouth every six (6) hours as needed for Pain.  methocarbamol (ROBAXIN) 500 mg tablet Take 1 Tab by mouth two (2) times daily as needed for Pain.  rosuvastatin (CRESTOR) 20 mg tablet Take 1 tablet by mouth nightly.  OXYMETAZOLINE HCL (AFRIN EXTRA MOISTURIZING NA) by Nasal route two (2) times a day.  cetirizine (ZYRTEC) 10 mg tablet Take  by mouth daily.  coenzyme Q-10 (CO Q-10) 200 mg capsule Take  by mouth daily.  zolpidem (AMBIEN) 10 mg tablet Take 5 mg by mouth nightly as needed. No current facility-administered medications for this visit. Vitals:  
 08/28/18 0945 08/28/18 1800 BP: 120/68 120/70 Pulse: 61 Resp: 18 SpO2: 96% Weight: 225 lb 6.4 oz (102.2 kg) Height: 6' 3\" (1.905 m) Social History Social History  Marital status:  Spouse name: N/A  
 Number of children: N/A  
 Years of education: N/A Occupational History  Not on file. Social History Main Topics  Smoking status: Former Smoker Quit date: 5/8/2003  Smokeless tobacco: Never Used  Alcohol use No  
 Drug use: No  
 Sexual activity: Not on file Other Topics Concern  Not on file Social History Narrative I have reviewed the nurses notes, vitals, problem list, allergy list, medical history, family, social history and medications. Review of Symptoms: 
 
General: Pt denies excessive weight gain or loss. Pt is able to conduct ADL's HEENT: Denies blurred vision, headaches, epistaxis and difficulty swallowing. Respiratory: Denies shortness of breath, + WINTERS, wheezing or stridor. Cardiovascular: Denies precordial pain, + palpitations, edema or PND Gastrointestinal: Denies poor appetite, indigestion, abdominal pain or blood in stool Musculoskeletal: Denies pain or swelling from muscles or joints the exception of legs as stated in the HPI Neurologic: Denies tremor, paresthesias, or sensory motor disturbance Skin: Denies rash, itching or texture change. Physical Exam:   
 
General: Well developed, in no acute distress, cooperative and alert HEENT: No carotid bruits, no JVD, trach is midline. Neck Supple, PEERL, EOM intact. Heart:  Normal S1/S2 negative S3 or S4. Regular, no murmur, gallop or rub.  
Respiratory: Clear bilaterally x 4, no wheezing or rales Abdomen:   Soft, non-tender, no masses, bowel sounds are active.  
Extremities:  No edema, normal cap refill, no cyanosis, atraumatic. Neuro: A&Ox3, speech clear, gait stable. Skin: Skin color is normal. No rashes or lesions. Non diaphoretic Vascular: 2+ pulses symmetric in all extremities Cardiographics ECG: Sinus rhythm No results found for this or any previous visit. Cardiology Labs: 
Lab Results Component Value Date/Time Cholesterol, total 313 (H) 04/18/2017 07:41 AM  
 HDL Cholesterol 30 (L) 04/18/2017 07:41 AM  
 LDL, calculated Comment 04/18/2017 07:41 AM  
 Triglyceride 409 (H) 04/18/2017 07:41 AM  
 
 
Lab Results Component Value Date/Time Sodium 139 04/18/2017 07:41 AM  
 Potassium 4.5 04/18/2017 07:41 AM  
 Chloride 100 04/18/2017 07:41 AM  
 CO2 25 04/18/2017 07:41 AM  
 Glucose 101 (H) 04/18/2017 07:41 AM  
 BUN 18 04/18/2017 07:41 AM  
 Creatinine 1.01 04/18/2017 07:41 AM  
 BUN/Creatinine ratio 18 04/18/2017 07:41 AM  
 GFR est  04/18/2017 07:41 AM  
 GFR est non-AA 88 04/18/2017 07:41 AM  
 Calcium 9.5 04/18/2017 07:41 AM  
 Bilirubin, total 0.6 04/18/2017 07:41 AM  
 AST (SGOT) 17 04/18/2017 07:41 AM  
 Alk.  phosphatase 68 04/18/2017 07:41 AM  
 Protein, total 6.9 04/18/2017 07:41 AM  
 Albumin 4.3 04/18/2017 07:41 AM  
 A-G Ratio 1.7 04/18/2017 07:41 AM  
 ALT (SGPT) 21 04/18/2017 07:41 AM  
  
 
 
 Assessment: 
 
 Assessment:  
 
Diagnoses and all orders for this visit: 
 
1. Intermittent palpitations -     METABOLIC PANEL, COMPREHENSIVE 
-     CK -     LIPID PANEL 
-     LDL, DIRECT 
-     LOOP MONITOR, Clinic Performed; Future -     PULMONARY FUNCTION TEST; Future -     ANKLE BRACHIAL INDEX; Future 2. Mixed hyperlipidemia -     AMB POC EKG ROUTINE W/ 12 LEADS, INTER & REP 
-     METABOLIC PANEL, COMPREHENSIVE 
-     CK -     LIPID PANEL 
-     LDL, DIRECT 
-     LOOP MONITOR, Clinic Performed; Future -     PULMONARY FUNCTION TEST; Future -     ANKLE BRACHIAL INDEX; Future 3. Family history of ischemic heart disease -     METABOLIC PANEL, COMPREHENSIVE 
-     CK -     LIPID PANEL 
-     LDL, DIRECT 
-     LOOP MONITOR, Clinic Performed; Future -     PULMONARY FUNCTION TEST; Future -     ANKLE BRACHIAL INDEX; Future 4. Familial hyperlipidemia, high LDL 
-     METABOLIC PANEL, COMPREHENSIVE 
-     CK -     LIPID PANEL 
-     LDL, DIRECT 
-     LOOP MONITOR, Clinic Performed; Future -     PULMONARY FUNCTION TEST; Future -     ANKLE BRACHIAL INDEX; Future 5. WINTERS (dyspnea on exertion) -     PULMONARY FUNCTION TEST; Future -     ANKLE BRACHIAL INDEX; Future 6. Pain in both lower extremities -     PULMONARY FUNCTION TEST; Future -     ANKLE BRACHIAL INDEX; Future ICD-10-CM ICD-9-CM 1. Intermittent palpitations S37.6 084.7 METABOLIC PANEL, COMPREHENSIVE  
   CK  
   LIPID PANEL  
   LDL, DIRECT  
   LOOP MONITOR PULMONARY FUNCTION TEST ANKLE BRACHIAL INDEX 2. Mixed hyperlipidemia E78.2 272.2 AMB POC EKG ROUTINE W/ 12 LEADS, INTER & REP  
   METABOLIC PANEL, COMPREHENSIVE  
   CK  
   LIPID PANEL  
   LDL, DIRECT  
   LOOP MONITOR PULMONARY FUNCTION TEST ANKLE BRACHIAL INDEX 3. Family history of ischemic heart disease H94.45 G75.5 METABOLIC PANEL, COMPREHENSIVE  
   CK  
   LIPID PANEL  
   LDL, DIRECT  
   LOOP MONITOR PULMONARY FUNCTION TEST ANKLE BRACHIAL INDEX 4. Familial hyperlipidemia, high LDL L94.1 513.4 METABOLIC PANEL, COMPREHENSIVE  
   CK  
   LIPID PANEL  
   LDL, DIRECT  
   LOOP MONITOR PULMONARY FUNCTION TEST ANKLE BRACHIAL INDEX 5. WINTERS (dyspnea on exertion) R06.09 786.09 PULMONARY FUNCTION TEST ANKLE BRACHIAL INDEX 6. Pain in both lower extremities M79.604 729.5 PULMONARY FUNCTION TEST  
 M79.605  ANKLE BRACHIAL INDEX Orders Placed This Encounter  ANKLE BRACHIAL INDEX Standing Status:   Future Standing Expiration Date:   9/28/2019 Order Specific Question:   Reason for Exam  
  Answer:   leg pain w exertional activities  METABOLIC PANEL, COMPREHENSIVE  
 CK  LIPID PANEL  LDL, DIRECT  
 LOOP MONITOR, Clinic Performed Standing Status:   Future Standing Expiration Date:   2/28/2019 Order Specific Question:   Reason for Exam: Answer:   palpitations  AMB POC EKG ROUTINE W/ 12 LEADS, INTER & REP Order Specific Question:   Reason for Exam: Answer:   routine  PULMONARY FUNCTION TEST Standing Status:   Future Standing Expiration Date:   11/28/2018  Cholecalciferol, Vitamin D3, 3,000 unit tab Sig: Take 5,000 Units by mouth daily.  vitamin E (AQUA GEMS) 400 unit capsule Sig: Take  by mouth daily.  cyanocobalamin 1,000 mcg tablet Sig: Take 1,000 mcg by mouth daily.  OTHER Sig: Take 1 Tab by mouth daily. Indications: NATURE MADE CHOLESTOFF PLUS  
 aspirin delayed-release 81 mg tablet Sig: Take  by mouth daily.  red yeast rice extract 600 mg cap Sig: Take 1,200 mg by mouth daily.  ASHWAGANDHA ROOT EXTRACT,BULK,  
  Sig: Take 183 mg by mouth two (2) times a day.   
 multivitamin-min-iron-FA-vit K (ADULTS MULTIVITAMIN) 18 mg iron-400 mcg-25 mcg tab Sig: Take  by mouth. Plan:  
 
1. Intermittent palpitations: Occurs during sexual activity or other physically active states, will evaluate with event monitor. Have recommended to patient to purchase alive cor home monitoring device. 2.  Hyperlipidemia: Seemingly familial in nature we will recheck lipid panel intolerant to simvastatin and Crestor. Will begin process for PSK 9 therapy the injection was demonstrated at this visit 3. Leg pain: Occurs with activity however patient feels it has been present since statin therapy from years ago. Will evaluate with ERIBERTO 4. Intermittent dyspnea with eating and/or at rest: Former smoker, works in [employer] will evaluate for pulmonary anomalies with PFTs. Follow-up when testing complete. Luli Donovan NP This note was created using voice recognition software. Despite editing, there may be syntax errors. Pt seen and examined in details. Agree with NP A&P. Normal stress test last year. Check Echo. D/w pt. Reduce caffeine intake.   
 
Andry Beavers MD

## 2018-08-28 NOTE — PROGRESS NOTES
1. Have you been to the ER, urgent care clinic since your last visit? Hospitalized since your last visit? NO 
 
2. Have you seen or consulted any other health care providers outside of the [facility] since your last visit? Include any pap smears or colon screening. YES PCP ANNUAL. C/O SOB, HEART FLUTTERING OFF AND ON.

## 2018-08-28 NOTE — MR AVS SNAPSHOT
[address] [facility] 
[phone] Patient: Mary England MRN: [id number] :1970 Visit Information Date & Time Provider Department Dept. Phone Encounter #  
 2018  9:45 AM Manuel Mueller, [facility] Cardiology [facility] [phone] [id number] Follow-up Instructions Return in about 1 year (around 2019). Routing History Your Appointments 2018 10:00 AM  
ECHO CARDIOGRAMS 2D with [address] [place] [address] Cardiology [facility]) Appt Note: Dr Ofelia Jimenez [[id number]] ([id number] [id number]) 2D ECHO COMPLETE ADULT (TTE) W OR WO CONTR Ardeth Snuffer (Order [id number]) [address] [place] [address]  
[phone] [address] [place] [address]  
  
    
 2018 11:30 AM  
VASCULAR TEST with VASCULAR, [facility] Cardiology [facility]) Appt Note: Dr Ofelia Jimenez [[id number]] ([id number]) 2D ECHO COMPLETE ADULT (TTE) W OR WO CONTR Ardeth Snuffer (Order [id number]) [address] [place] [address]  
[phone] [address] [place] [address]  
  
    
 2018  9:00 AM  
PROCEDURE with Freddy Amador [facility] Cardiology [facility]) Appt Note: Dr. Moni Singer (Order [id number]) ,JAR  
 [phone] [facility]  
[phone] [address] [place] [address] Upcoming Health Maintenance Date Due DTaP/Tdap/Td series (1 - Tdap) 1991 Influenza Age 5 to Adult 2018 Allergies as of 2018  Review Complete On: 2018 By: Manuel Mueller MD  
 No Known Allergies Current Immunizations  Never Reviewed No immunizations on file. Not reviewed this visit You Were Diagnosed With   
  
 Codes Comments Intermittent palpitations    -  Primary ICD-10-CM: R00.2 ICD-9-CM: 785.1 Mixed hyperlipidemia     ICD-10-CM: E78.2 ICD-9-CM: 272.2 Family history of ischemic heart disease     ICD-10-CM: Z82.49 
ICD-9-CM: V17.3 Familial hyperlipidemia, high LDL     ICD-10-CM: E78.4 ICD-9-CM: 272.4 WINTERS (dyspnea on exertion)     ICD-10-CM: R06.09 
ICD-9-CM: 786.09 Pain in both lower extremities     ICD-10-CM: M79.604, M79.605 ICD-9-CM: 729.5 Vitals BP Pulse Resp Height(growth percentile) Weight(growth percentile) SpO2  
 120/70 (BP 1 Location: Right arm, BP Patient Position: Sitting) 61 18 6' 3\" (1.905 m) 225 lb 6.4 oz (102.2 kg) 96% BMI Smoking Status 28.17 kg/m2 Former Smoker Vitals History BMI and BSA Data Body Mass Index Body Surface Area  
 28.17 kg/m 2 2.33 m 2 Preferred Pharmacy Pharmacy Name Phone [facility] [address] [facility], [address] [place] [address] AT [address] [place] [address] [phone] Your Updated Medication List  
  
   
This list is accurate as of 8/28/18 11:12 AM.  Always use your most recent med list.  
  
  
  
  
 ADULTS MULTIVITAMIN 18 mg iron-400 mcg-25 mcg Tab Generic drug:  multivitamin-min-iron-FA-vit K Take  by mouth. AFRIN EXTRA MOISTURIZING NA  
by Nasal route two (2) times a day. AMBIEN 10 mg tablet Generic drug:  zolpidem Take 5 mg by mouth nightly as needed. ASHWAGANDHA ROOT EXTRACT(BULK) Take 183 mg by mouth two (2) times a day. aspirin delayed-release 81 mg tablet Take  by mouth daily. Cholecalciferol (Vitamin D3) 3,000 unit Tab Take 5,000 Units by mouth daily. CO Q-10 200 mg capsule Generic drug:  coenzyme Q-10 Take  by mouth daily. cyanocobalamin 1,000 mcg tablet Take 1,000 mcg by mouth daily. FISH  mg Cap Generic drug:  Omega-3 Fatty Acids Take 500 mg by mouth. JOHANN RED  
  
 ketorolac 10 mg tablet Commonly known as:  TORADOL Take 1 Tab by mouth every six (6) hours as needed for Pain. methocarbamol 500 mg tablet Commonly known as:  ROBAXIN Take 1 Tab by mouth two (2) times daily as needed for Pain. MOTRIN PO Take 600 mg by mouth as needed. OTHER Take 1 Tab by mouth daily. Indications: NATURE MADE CHOLESTOFF PLUS  
  
 red yeast rice extract 600 mg Cap Take 1,200 mg by mouth daily. rosuvastatin 20 mg tablet Commonly known as:  CRESTOR Take 1 tablet by mouth nightly. vitamin E 400 unit capsule Commonly known as:  [address] Take  by mouth daily. ZyrTEC 10 mg tablet Generic drug:  cetirizine Take  by mouth daily. We Performed the Following AMB POC EKG ROUTINE W/ 12 LEADS, INTER & REP [97130 CPT(R)] CK [id number] CPT(R)] LDL, DIRECT [id number] CPT(R)] LIPID PANEL [43939 CPT(R)] METABOLIC PANEL, COMPREHENSIVE [40733 CPT(R)] Follow-up Instructions Return in about 1 year (around 8/28/2019). To-Do List   
 08/28/2018 ECHO:  2D ECHO COMPLETE ADULT (TTE) W OR WO CONTR   
  
 08/29/2018 Imaging:  ANKLE BRACHIAL INDEX   
  
 08/30/2018 Cardiac Services:  LOOP MONITOR   
  
 08/31/2018 PFT:  PULMONARY FUNCTION TEST Introducing [facility]! [employer] introduces [employer] patient portal. Now you can access parts of your medical record, email your doctor's office, and request medication refills online. 1. In your internet browser, go to [email or web address] "[email or web address]/Rethink 2. Click on the First Time User? Click Here link in the Sign In box. You will see the New Member Sign Up page. 3. Enter your [employer] Access Code exactly as it appears below. You will not need to use this code after youve completed the sign-up process. If you do not sign up before the expiration date, you must request a new code. · [employer] Access Code: [email or web address] Expires: 11/26/2018 11:02 AM 
 
 4. Enter the last four digits of your Social Security Number (xxxx) and Date of Birth (mm/dd/yyyy) as indicated and click Submit. You will be taken to the next sign-up page. 5. Create a Ditto ID. This will be your Ditto login ID and cannot be changed, so think of one that is secure and easy to remember. 6. Create a Ditto password. You can change your password at any time. 7. Enter your Password Reset Question and Answer. This can be used at a later time if you forget your password. 8. Enter your e-mail address. You will receive e-mail notification when new information is available in [address] 9. Click Sign Up. You can now view and download portions of your medical record. 10. Click the Download Summary menu link to download a portable copy of your medical information. If you have questions, please visit the Frequently Asked Questions section of the Ditto website. Remember, Ditto is NOT to be used for urgent needs. For medical emergencies, dial 911. Now available from your iPhone and Android! Please provide this summary of care documentation to your next provider. Your primary care clinician is listed as [address]. If you have any questions after today's visit, please call [phone].

## 2018-09-05 ENCOUNTER — TELEPHONE (OUTPATIENT)
Dept: CARDIOLOGY CLINIC | Age: 48
End: 2018-09-05

## 2018-09-05 ENCOUNTER — CLINICAL SUPPORT (OUTPATIENT)
Dept: CARDIOLOGY CLINIC | Age: 48
End: 2018-09-05

## 2018-09-05 DIAGNOSIS — E78.2 MIXED HYPERLIPIDEMIA: ICD-10-CM

## 2018-09-05 DIAGNOSIS — R06.09 DOE (DYSPNEA ON EXERTION): ICD-10-CM

## 2018-09-05 DIAGNOSIS — E78.49 FAMILIAL HYPERLIPIDEMIA, HIGH LDL: ICD-10-CM

## 2018-09-05 DIAGNOSIS — M79.605 PAIN IN BOTH LOWER EXTREMITIES: Primary | ICD-10-CM

## 2018-09-05 DIAGNOSIS — M79.605 PAIN IN BOTH LOWER EXTREMITIES: ICD-10-CM

## 2018-09-05 DIAGNOSIS — R00.2 INTERMITTENT PALPITATIONS: ICD-10-CM

## 2018-09-05 DIAGNOSIS — Z82.49 FAMILY HISTORY OF ISCHEMIC HEART DISEASE: ICD-10-CM

## 2018-09-05 DIAGNOSIS — M79.604 PAIN IN BOTH LOWER EXTREMITIES: ICD-10-CM

## 2018-09-05 DIAGNOSIS — M79.604 PAIN IN BOTH LOWER EXTREMITIES: Primary | ICD-10-CM

## 2018-09-07 ENCOUNTER — TELEPHONE (OUTPATIENT)
Dept: CARDIOLOGY CLINIC | Age: 48
End: 2018-09-07

## 2018-09-07 NOTE — TELEPHONE ENCOUNTER
Please return phone call for patient to patient's wife. Patient is working and wife can speak with you on his behalf.  *on hipaa*  Thanks,    [employer]

## 2018-09-07 NOTE — TELEPHONE ENCOUNTER
----- Message from Hay Leon MD sent at 9/6/2018  9:29 AM EDT -----  Inform him Echo is k      Left message to call me back. Pt's wife returned my call, Gio Duque on [employer] form, advised her that pt's echo is normal. She verbalized understanding.

## 2018-10-16 ENCOUNTER — TELEPHONE (OUTPATIENT)
Dept: CARDIOLOGY CLINIC | Age: 48
End: 2018-10-16

## 2018-10-16 NOTE — TELEPHONE ENCOUNTER
----- Message from Cruz Rubio NP sent at 10/15/2018  1:28 PM EDT -----  Please call patient event monitor did not detect arrhythmias . Called pt and left message to call me back. Returned wife's call,Shanna on HIPPA form, advised her that pt's monitor is normal and did not detect any arrhythmias. Wife verbalized understanding.

## 2019-01-10 ENCOUNTER — TELEPHONE (OUTPATIENT)
Dept: CARDIOLOGY CLINIC | Age: 49
End: 2019-01-10

## 2019-01-10 LAB
ALBUMIN SERPL-MCNC: 4.7 G/DL (ref 3.5–5.5)
ALBUMIN/GLOB SERPL: 1.8 {RATIO} (ref 1.2–2.2)
ALP SERPL-CCNC: 69 IU/L (ref 39–117)
ALT SERPL-CCNC: 28 IU/L (ref 0–44)
AST SERPL-CCNC: 17 IU/L (ref 0–40)
BILIRUB SERPL-MCNC: 1.4 MG/DL (ref 0–1.2)
BUN SERPL-MCNC: 24 MG/DL (ref 6–24)
BUN/CREAT SERPL: 21 (ref 9–20)
CALCIUM SERPL-MCNC: 9.6 MG/DL (ref 8.7–10.2)
CHLORIDE SERPL-SCNC: 103 MMOL/L (ref 96–106)
CHOLEST SERPL-MCNC: 297 MG/DL (ref 100–199)
CK SERPL-CCNC: 146 U/L (ref 24–204)
CO2 SERPL-SCNC: 26 MMOL/L (ref 20–29)
COMMENT, 011824: ABNORMAL
CREAT SERPL-MCNC: 1.16 MG/DL (ref 0.76–1.27)
GLOBULIN SER CALC-MCNC: 2.6 G/DL (ref 1.5–4.5)
GLUCOSE SERPL-MCNC: 88 MG/DL (ref 65–99)
HDLC SERPL-MCNC: 33 MG/DL
INTERPRETATION, 910389: NORMAL
LDLC SERPL CALC-MCNC: 209 MG/DL (ref 0–99)
LDLC SERPL DIRECT ASSAY-MCNC: 221 MG/DL (ref 0–99)
POTASSIUM SERPL-SCNC: 4.9 MMOL/L (ref 3.5–5.2)
PROT SERPL-MCNC: 7.3 G/DL (ref 6–8.5)
SODIUM SERPL-SCNC: 142 MMOL/L (ref 134–144)
TRIGL SERPL-MCNC: 273 MG/DL (ref 0–149)
VLDLC SERPL CALC-MCNC: 55 MG/DL (ref 5–40)

## 2019-01-10 NOTE — PROGRESS NOTES
Tricia, please call patient markedly high LDL, we now have baseline labs to proceed with Repatha process. I placed RX on your desk.

## 2019-01-10 NOTE — TELEPHONE ENCOUNTER
----- Message from Phong Prado NP sent at 1/10/2019  8:58 AM EST -----  Tricia, please call patient markedly high LDL, we now have baseline labs to proceed with Repatha process. I placed RX on your desk. Called pt, spoke to wife Sarah Best on HIPPA form, went over labs with wife and advised that Georgann Sensing is needed. Advised of process with Repatha and it can take a long time. Advised her I would start the process and paper work. Once I was done with that I would call the pt to come into the office to sign the form and I would fax off. She verbalized understanding.

## 2019-01-24 ENCOUNTER — DOCUMENTATION ONLY (OUTPATIENT)
Dept: CARDIOLOGY CLINIC | Age: 49
End: 2019-01-24

## 2019-01-24 NOTE — PROGRESS NOTES
Faxed RX for Repatha to [id number] listed in cc. Faxed to [phone] and received fax confirmation.

## 2019-01-25 ENCOUNTER — TELEPHONE (OUTPATIENT)
Dept: CARDIOLOGY CLINIC | Age: 49
End: 2019-01-25

## 2019-01-25 NOTE — TELEPHONE ENCOUNTER
Pt's wife faxed Juan Acevedo from [facility] PFT order from 8/28 but there was no diagnosis code. Juan Acevedo would like a new one with diagnosis code faxed to her [phone].   Thanks, [employer]

## 2019-01-29 DIAGNOSIS — R06.09 DOE (DYSPNEA ON EXERTION): Primary | ICD-10-CM

## 2019-01-29 DIAGNOSIS — Z87.891 FORMER SMOKER: ICD-10-CM

## 2019-01-29 NOTE — TELEPHONE ENCOUNTER
Jocelyne Pérez   You; Lien Haynes MD Yesterday (10:22 AM)      Dr. Mikie Wen, central scheduling needs a dx code for PFT ordered on 8/28/18. Sunday Pare you! DORI Larson MD 4 hours ago (12:03 PM)      Done.   (Routing comment)       Lien Haynes MD routed conversation to Abel Figueroa NP

## 2019-02-01 ENCOUNTER — HOSPITAL ENCOUNTER (OUTPATIENT)
Dept: PULMONOLOGY | Age: 49
Discharge: HOME | End: 2019-02-01
Attending: NURSE PRACTITIONER
Payer: COMMERCIAL

## 2019-02-01 DIAGNOSIS — R00.2 PALPITATIONS: ICD-10-CM

## 2019-02-01 DIAGNOSIS — Z82.49 FAMILY HISTORY OF ISCHEMIC HEART DISEASE: ICD-10-CM

## 2019-02-01 DIAGNOSIS — E78.2 MIXED HYPERLIPIDEMIA: ICD-10-CM

## 2019-02-01 DIAGNOSIS — M79.605 LEFT LEG PAIN: ICD-10-CM

## 2019-02-01 DIAGNOSIS — E78.41 ELEVATED LIPOPROTEIN A LEVEL: ICD-10-CM

## 2019-02-01 DIAGNOSIS — M79.604 RIGHT LEG PAIN: ICD-10-CM

## 2019-02-01 DIAGNOSIS — R06.09 DYSPNEA ON EXERTION: ICD-10-CM

## 2019-02-01 PROCEDURE — 94726 PLETHYSMOGRAPHY LUNG VOLUMES: CPT

## 2019-02-01 PROCEDURE — 94375 RESPIRATORY FLOW VOLUME LOOP: CPT

## 2019-02-01 PROCEDURE — 94729 DIFFUSING CAPACITY: CPT

## 2019-02-05 ENCOUNTER — DOCUMENTATION ONLY (OUTPATIENT)
Dept: CARDIOLOGY CLINIC | Age: 49
End: 2019-02-05

## 2019-02-05 NOTE — PROGRESS NOTES
Received prior auth for Repatha . Went on cover my meds and filled out info. it was submitted and stated to give them 24-72 hours for review.

## 2019-02-07 ENCOUNTER — TELEPHONE (OUTPATIENT)
Dept: CARDIOLOGY CLINIC | Age: 49
End: 2019-02-07

## 2019-02-07 NOTE — TELEPHONE ENCOUNTER
Please call regarding forms that patient needs to sign. I did advise on 2/5/19 forms were faxed.   Thanks

## 2019-02-07 NOTE — TELEPHONE ENCOUNTER
Called pt's wife, Neehmias Hemphill on [facility] form, advised her that I had taken care of the Repatha forms, I have done the prior auth and just in fact today received a denial letter from insurance stating that pt does not meet the criteria to approve the medication. I advised her I would send to [facility] or  to see what the next step is. Advised her I would call when I know something. She verbalized understanding.       Alexsandra Mendosa, DORI 5 days ago      The Repatha was denied. What do you want to try next for pt? Please advise, thanks.  Sara Pena, [facility], NP   You 4 days ago      He has familiar hyperlipedemia direct  with documented statin intolerance, show me the paper work please,

## 2019-02-12 NOTE — TELEPHONE ENCOUNTER
Pt's wife called in,verified Jennifer Mario on [employer] form, she wanted to know if the 5 dollar coupon card would be an option for him. I advised her that I was not sure. Advised her that we are in the process of appealing this decision. She advised she would just wait to hear from us then. Wife verbalized understanding.

## 2019-02-20 ENCOUNTER — TELEPHONE (OUTPATIENT)
Dept: CARDIOLOGY CLINIC | Age: 49
End: 2019-02-20

## 2019-02-20 NOTE — PROCEDURES
[address]  PULMONARY FUNCTION TEST    Name:  Jalyn Johnson  MR#:  [id number]  :  1970  ACCOUNT #:  [de-identified]  DATE OF SERVICE:  2019    REASON FOR THE TEST:  Dyspnea with exertion. Spirometry and lung volumes were performed and they revealed:  1. No airflow obstruction. 2.  No restrictive lung disease. 3.  Normal DLCO. 4.  Normal flow volume loop. Alice Hurt MD      EG/[id number]/  D:  2019 11:14  T:  2019 20:09  JOB #:  [id number]  CC:   Ania Vickers NP

## 2019-02-25 ENCOUNTER — TELEPHONE (OUTPATIENT)
Dept: OTHER | Age: 49
End: 2019-02-25

## 2019-02-27 ENCOUNTER — TELEPHONE (OUTPATIENT)
Dept: CARDIOLOGY CLINIC | Age: 49
End: 2019-02-27

## 2019-02-27 NOTE — TELEPHONE ENCOUNTER
----- Message from Deng Padilla NP sent at 2/20/2019  8:31 AM EST -----  Please call patient, PFTs are normal    Wife Celia Quarles , on HIPPA form, called in for results-she advised that they have been playing phone tag with another nurse in office. I looked in her mail box and let her know that pt's PFT is normal. Advised her that I am working on the [address] [place] [address] denial also. Wife verbalized understanding.

## 2019-02-28 NOTE — PROGRESS NOTES
Spoke to pt's wife, [facility] on HIPAA using two patient identifiers.   Per Francie Simmons NP, she was made aware that PFTs are normal.

## 2019-03-04 NOTE — TELEPHONE ENCOUNTER
Faxed signed appeal form to [phone] with office note, labs and denial letter. Faxed to ATTN: reconsideration appeal. Received fax confirmation.

## 2019-04-04 ENCOUNTER — TELEPHONE (OUTPATIENT)
Dept: CARDIOLOGY CLINIC | Age: 49
End: 2019-04-04

## 2019-04-04 NOTE — TELEPHONE ENCOUNTER
Spoke with wife, on HPI. Verified patient with two patient identifiers. States the \"apex doctor\" wants some testing ordered. Advised to have them call us to schedule or fax the request to us and we can have the tests scheduled. Patient verbalized understanding.

## 2019-04-12 NOTE — TELEPHONE ENCOUNTER
Received fax from [employer] stating that Ronda Nguyễn was approved from 3/18/19 - 9/18/19. Called wife , Damaris Lara on HPI, advised her that I had received fax stating that pt has been approved for his Repatha from 3/18/19 until 9/18/19. She then advised that they do already know and pt received the medication on 4/4/19 and has started taking medication. She verbalized understanding.

## 2019-05-13 ENCOUNTER — TELEPHONE (OUTPATIENT)
Dept: CARDIOLOGY CLINIC | Age: 49
End: 2019-05-13

## 2019-05-14 NOTE — TELEPHONE ENCOUNTER
Returned wife's call,Shanna on HPI, she advised that pt and her were intimate the other day and he felt faint, soboe and he had to stop because he felt like he was going to pass out. He told his wife that he feels worse and his soboe is getting worse. His last appt was in August last year and due to f/u in 1 yr. His PCP wanted testing ordered but never contacted us. She wanted carotid doppler and venous doppler/arterial doppler ordered. I advised wife since his symptoms are getting worse then he needs to f/u in office and we can get that testing ordered and performed in office. Advised her in the meantime if he has chest pain, pressure, tightness , soboe, profuse sweating, nausea, vomiting that is lasting or different then he needs to go to the ER. Advised if he passes out then he needs to go to ER. Advised if he has pain that is not relived by rest then he needs to go to ER. Advised I would have our  call her to set up the appt. Wife verbalized understanding.

## 2019-05-14 NOTE — TELEPHONE ENCOUNTER
Please call patients wife she has some concerns, patient had an episode where he felt like he was going to black out on exertion, patient had numbness in left arm that lasted a few minutes. Patient also had sharp pain in the middle of his head.   No symptoms at this time     Wife listed on Hippa     Please advise    Thanks

## 2019-06-04 NOTE — TELEPHONE ENCOUNTER
Message   Received: 2 weeks ago   [facility], Tricia Rossi, LPN   Caller: Unspecified (3 weeks ago)             LM on VM to call, I tried 2X, waiting on him or his wife to return phone call

## 2019-06-05 NOTE — TELEPHONE ENCOUNTER
Message   Received: Tricia Belcher, LPN   Caller: Unspecified (3 weeks ago)             I tried calling again today, and no response, His number and emergency contact (spouse) have the same number. I left another VM, at this point all I can do is wait till he return the call      Noted. Thanks. I will close out encounter until wife or pt return our call.

## 2019-10-04 ENCOUNTER — TELEPHONE (OUTPATIENT)
Dept: CARDIOLOGY CLINIC | Age: 49
End: 2019-10-04

## 2019-10-04 NOTE — TELEPHONE ENCOUNTER
Called pt, spoke to wife Alan Hodge on [place], advised that I had received a re prior auth on his Repatha. Advised that I needed new labs on him. She advised that pt just had his lipid panel done at PCP office. She advised she would get that faxed over to me next week. Gave her fax number to fax me labs. Wife verbalized understanding. Called PCP office and left message to fax over most recent lipid panel or call if needed.

## 2019-10-15 NOTE — TELEPHONE ENCOUNTER
Pharmacy calling to see if we have faxed over lipid labs from PCP? They will reach out to PCP as well. Thanks!

## 2019-10-23 NOTE — TELEPHONE ENCOUNTER
Called PCP office and asked if they can fax over most recent lipid panel. She advised last lipid they have is from march. Advised to fax that. She advised she would get that faxed over.

## 2019-10-23 NOTE — TELEPHONE ENCOUNTER
Received most recent labs from PCP. Faxed labs to [facility] at [phone] did not go thru. Faxed last labs to [facility] at [phone] and received fax confirmation.

## 2019-10-28 ENCOUNTER — HOSPITAL ENCOUNTER (OUTPATIENT)
Dept: CT IMAGING | Age: 49
Discharge: HOME | End: 2019-10-28
Attending: FAMILY MEDICINE
Payer: COMMERCIAL

## 2019-10-28 DIAGNOSIS — R05.9 COUGH: ICD-10-CM

## 2019-10-28 DIAGNOSIS — R06.02 SHORTNESS OF BREATH: ICD-10-CM

## 2019-10-28 DIAGNOSIS — I50.9 HEART FAILURE, UNSPECIFIED (HCC): ICD-10-CM

## 2019-10-28 PROCEDURE — 74011000258 HC RX REV CODE- 258: Performed by: RADIOLOGY

## 2019-10-28 PROCEDURE — 71275 CT ANGIOGRAPHY CHEST: CPT

## 2019-10-28 PROCEDURE — 74011636320 HC RX REV CODE- 636/320: Performed by: RADIOLOGY

## 2019-10-28 RX ORDER — SODIUM CHLORIDE 0.9 % (FLUSH) 0.9 %
10 SYRINGE (ML) INJECTION
Status: COMPLETED | OUTPATIENT
Start: 2019-10-28 | End: 2019-10-28

## 2019-10-28 RX ADMIN — IOPAMIDOL 80 ML: 755 INJECTION, SOLUTION INTRAVENOUS at 10:45

## 2019-10-28 RX ADMIN — Medication 10 ML: at 10:45

## 2019-10-28 RX ADMIN — SODIUM CHLORIDE 100 ML: 900 INJECTION, SOLUTION INTRAVENOUS at 10:45

## 2019-10-30 ENCOUNTER — DOCUMENTATION ONLY (OUTPATIENT)
Dept: CARDIOLOGY CLINIC | Age: 49
End: 2019-10-30

## 2019-10-30 DIAGNOSIS — Z82.49 FAMILY HISTORY OF ISCHEMIC HEART DISEASE: Primary | ICD-10-CM

## 2019-10-30 DIAGNOSIS — E78.49 FAMILIAL HYPERLIPIDEMIA, HIGH LDL: ICD-10-CM

## 2019-10-30 DIAGNOSIS — E78.2 MIXED HYPERLIPIDEMIA: ICD-10-CM

## 2019-10-30 NOTE — PROGRESS NOTES
Mailed out lab slip to pt with note to check his labs-as we need a more recent check then the 3/19 we have. Advised to fast 8-12 hours before and to call if he has any questions.

## 2019-10-31 ENCOUNTER — DOCUMENTATION ONLY (OUTPATIENT)
Dept: CARDIOLOGY CLINIC | Age: 49
End: 2019-10-31

## 2019-10-31 NOTE — PROGRESS NOTES
Received labs from wife, faxed the labs to [address] [place] [address] at [phone] and received fax confirmation.

## 2019-11-07 ENCOUNTER — TELEPHONE (OUTPATIENT)
Dept: CARDIOLOGY CLINIC | Age: 49
End: 2019-11-07

## 2019-11-07 NOTE — TELEPHONE ENCOUNTER
Wife Gena Thompson called in, on PHI, she advised she just received lab slip for pt to get his labs done. Advised she can diregard this as I received his labs from PCP office and sent to [facility] so [facility] [address] [place] [address]. Wife verbalized understanding.

## 2019-11-08 ENCOUNTER — DOCUMENTATION ONLY (OUTPATIENT)
Dept: CARDIOLOGY CLINIC | Age: 49
End: 2019-11-08

## 2019-11-08 NOTE — PROGRESS NOTES
Received fax from [place] [employer] asking for signature on Repatha prior auth form. Had NPJean Claude sign and faxed back to [phone] and received fax confirmation.

## 2019-11-18 ENCOUNTER — DOCUMENTATION ONLY (OUTPATIENT)
Dept: CARDIOLOGY CLINIC | Age: 49
End: 2019-11-18

## 2019-11-18 NOTE — PROGRESS NOTES
Received fax from [employer] stating that [address] [place] [address] is approved from 11/15/19-11/14/20.

## 2020-01-17 ENCOUNTER — DOCUMENTATION ONLY (OUTPATIENT)
Dept: CARDIOLOGY CLINIC | Age: 50
End: 2020-01-17

## 2020-01-17 NOTE — PROGRESS NOTES
Received fax from [employer] stating that pt's Repatha needs a prior auth. I faxed note stating that the last prior auth was done on 11.15.19 and was approved until 11.14.20. faxed to [employer] at [phone] and received fax confirmation.

## 2020-05-21 ENCOUNTER — TELEPHONE (OUTPATIENT)
Dept: SLEEP MEDICINE | Age: 50
End: 2020-05-21

## 2020-05-21 DIAGNOSIS — G47.33 OSA (OBSTRUCTIVE SLEEP APNEA): Primary | ICD-10-CM

## 2020-05-26 ENCOUNTER — DOCUMENTATION ONLY (OUTPATIENT)
Dept: SLEEP MEDICINE | Age: 50
End: 2020-05-26

## 2020-05-26 NOTE — TELEPHONE ENCOUNTER
Remote download reviewed, current pressure at 10 cmH2O. Order written for pressure increase per patient request.    Orders Placed This Encounter    AMB SUPPLY ORDER     Diagnosis: Sleep Apnea ICD-10 Code (G47.30); ICD-9 Code (780.57). * Device pressure change to CPAP  12 cmH2O. Yeyo Mora MD, FAASM; NPI: [id number]  Electronically signed.  05/26/20

## 2020-05-26 NOTE — TELEPHONE ENCOUNTER
Faxed order and left message with patient informing them of the pressure setting change. Also told them an appointment is recommended yearly and should be scheduled some time in July once our negotiations with [employer] should be resolved.

## 2020-07-22 RX ORDER — EVOLOCUMAB 140 MG/ML
INJECTION, SOLUTION SUBCUTANEOUS
Qty: 2 ML | Refills: 1 | Status: SHIPPED | OUTPATIENT
Start: 2020-07-22 | End: 2020-09-24

## 2020-09-24 RX ORDER — EVOLOCUMAB 140 MG/ML
INJECTION, SOLUTION SUBCUTANEOUS
Qty: 2 ML | Refills: 1 | Status: SHIPPED | OUTPATIENT
Start: 2020-09-24 | End: 2020-11-17

## 2020-11-17 RX ORDER — EVOLOCUMAB 140 MG/ML
INJECTION, SOLUTION SUBCUTANEOUS
Qty: 2 ML | Refills: 1 | Status: SHIPPED | OUTPATIENT
Start: 2020-11-17

## 2020-11-25 ENCOUNTER — TELEPHONE (OUTPATIENT)
Dept: CARDIOLOGY CLINIC | Age: 50
End: 2020-11-25

## 2020-11-25 NOTE — TELEPHONE ENCOUNTER
[facility] called saying they need recent lipid panel orders for the pt to get lab draw before they can fill the prescription for repatha that was just sent to them by Dr. Torres Lipscomb, Can you have Dr. Torres Lipscomb put those lab orders in for lipid panel and advise the pt

## 2020-11-27 NOTE — TELEPHONE ENCOUNTER
Faxed post card to pt advising we have not seen you since 2018. Please call and make an appt for further refills. Last appt in 2018 and no labs since 2019. Will need an appt for refills. Will close out this encounter as I have send post card to pt.

## 2021-01-07 ENCOUNTER — TELEPHONE (OUTPATIENT)
Dept: CARDIOLOGY CLINIC | Age: 51
End: 2021-01-07

## 2021-01-07 NOTE — TELEPHONE ENCOUNTER
Returned [facility], they wanted to know if we had labs/lipid panel on pt. Advised we do not. We have not seen pt since 2018 and no labs. Advised pt will need an appt with us and labs first. Advised they can try the PCP. He advised they have and they do not have any labs, and they cannot get in contact with pt. Advised we have not spoke to pt either. He verbalized understanding and thanked me for my help.

## 2022-03-02 ENCOUNTER — OFFICE VISIT (OUTPATIENT)
Dept: SLEEP MEDICINE | Age: 52
End: 2022-03-02
Payer: COMMERCIAL

## 2022-03-02 VITALS
DIASTOLIC BLOOD PRESSURE: 75 MMHG | BODY MASS INDEX: 30.61 KG/M2 | RESPIRATION RATE: 20 BRPM | HEIGHT: 75 IN | OXYGEN SATURATION: 97 % | TEMPERATURE: 98.1 F | HEART RATE: 72 BPM | SYSTOLIC BLOOD PRESSURE: 121 MMHG | WEIGHT: 246.2 LBS

## 2022-03-02 DIAGNOSIS — Z11.52 ENCOUNTER FOR SCREENING FOR COVID-19: ICD-10-CM

## 2022-03-02 DIAGNOSIS — G47.33 OSA (OBSTRUCTIVE SLEEP APNEA): Primary | ICD-10-CM

## 2022-03-02 PROCEDURE — 99204 OFFICE O/P NEW MOD 45 MIN: CPT | Performed by: INTERNAL MEDICINE

## 2022-03-02 RX ORDER — EZETIMIBE 10 MG/1
10 TABLET ORAL
COMMUNITY

## 2022-03-02 RX ORDER — LISINOPRIL 2.5 MG/1
2.5 TABLET ORAL DAILY
COMMUNITY

## 2022-03-02 RX ORDER — METOPROLOL TARTRATE 25 MG/1
25 TABLET, FILM COATED ORAL 2 TIMES DAILY
COMMUNITY

## 2022-03-02 NOTE — PATIENT INSTRUCTIONS
217 [facility]., [place]. [place], [address]  Tel.  [phone]  Fax. 100 [facility] [address]  [place], [address] [facility]  Tel.  [phone]  Fax. [phone] [address]  Tel.  [phone]  Fax. [phone]     Learning About CPAP for Sleep Apnea  What is CPAP? CPAP is a small machine that you use at home every night while you sleep. It increases air pressure in your throat to keep your airway open. When you have sleep apnea, this can help you sleep better so you feel much better. CPAP stands for \"continuous positive airway pressure. \"  The CPAP machine will have one of the following:  · A mask that covers your nose and mouth  · Prongs that fit into your nose  · A mask that covers your nose only, the most common type. This type is called NCPAP. The N stands for \"nasal.\"  Why is it done? CPAP is usually the best treatment for obstructive sleep apnea. It is the first treatment choice and the most widely used. Your doctor may suggest CPAP if you have:  · Moderate to severe sleep apnea. · Sleep apnea and coronary artery disease (CAD) or heart failure. How does it help? · CPAP can help you have more normal sleep, so you feel less sleepy and more alert during the daytime. · CPAP may help keep heart failure or other heart problems from getting worse. · NCPAP may help lower your blood pressure. · If you use CPAP, your bed partner may also sleep better because you are not snoring or restless. What are the side effects? Some people who use CPAP have:  · A dry or stuffy nose and a sore throat. · Irritated skin on the face. · Sore eyes. · Bloating. If you have any of these problems, work with your doctor to fix them. Here are some things you can try:  · Be sure the mask or nasal prongs fit well. · See if your doctor can adjust the pressure of your CPAP. · If your nose is dry, try a humidifier.   · If your nose is runny or stuffy, try decongestant medicine or a steroid nasal spray. If these things do not help, you might try a different type of machine. Some machines have air pressure that adjusts on its own. Others have air pressures that are different when you breathe in than when you breathe out. This may reduce discomfort caused by too much pressure in your nose. Where can you learn more? Go to EyeNetra.be  Enter Uzma Dugan in the search box to learn more about \"Learning About CPAP for Sleep Apnea. \"   © 1744-0790 Healthwise, Incorporated. Care instructions adapted under license by [facility] [employer] (which disclaims liability or warranty for this information). This care instruction is for use with your licensed healthcare professional. If you have questions about a medical condition or this instruction, always ask your healthcare professional. [address] any warranty or liability for your use of this information. Content Version: 2.9.09049; Last Revised: January 11, 2010  PROPER SLEEP HYGIENE    What to avoid  · Do not have drinks with caffeine, such as coffee or black tea, for 8 hours before bed. · Do not smoke or use other types of tobacco near bedtime. Nicotine is a stimulant and can keep you awake. · Avoid drinking alcohol late in the evening, because it can cause you to wake in the middle of the night. · Do not eat a big meal close to bedtime. If you are hungry, eat a light snack. · Do not drink a lot of water close to bedtime, because the need to urinate may wake you up during the night. · Do not read or watch TV in bed. Use the bed only for sleeping and sexual activity. What to try  · Go to bed at the same time every night, and wake up at the same time every morning. Do not take naps during the day. · Keep your bedroom quiet, dark, and cool. · Get regular exercise, but not within 3 to 4 hours of your bedtime. .  · Sleep on a comfortable pillow and mattress.   · If watching the clock makes you anxious, turn it facing away from you so you cannot see the time. · If you worry when you lie down, start a worry book. Well before bedtime, write down your worries, and then set the book and your concerns aside. · Try meditation or other relaxation techniques before you go to bed. · If you cannot fall asleep, get up and go to another room until you feel sleepy. Do something relaxing. Repeat your bedtime routine before you go to bed again. · Make your house quiet and calm about an hour before bedtime. Turn down the lights, turn off the TV, log off the computer, and turn down the volume on music. This can help you relax after a busy day. Drowsy Driving: The [employer] cites drowsiness as a causing factor in more than 799,471 police reported crashes annually, resulting in 76,000 injuries and 1,500 deaths. Other surveys suggest 55% of people polled have driven while drowsy in the past year, 23% had fallen asleep but not crashed, 3% crashed, and 2% had and accident due to drowsy driving. Who is at risk? Young Drivers: One study of drowsy driving accidents states that 55% of the drivers were under 25 years. Of those, 75% were male. Shift Workers and Travelers: People who work overnight or travel across time zones frequently are at higher risk of experiencing Circadian Rhythm Disorders. They are trying to work and function when their body is programed to sleep. Sleep Deprived: Lack of sleep has a serious impact on your ability to pay attention or focus on a task. Consistently getting less than the average of 8 hours your body needs creates partial or cumulative sleep deprivation. Untreated Sleep Disorders: Sleep Apnea, Narcolepsy, R.L.S., and other sleep disorders (untreated) prevent a person from getting enough restful sleep. This leads to excessive daytime sleepiness and increases the risk for drowsy driving accidents by up to 7 times.   Medications / Alcohol: Even over the counter medications can cause drowsiness. Medications that impair a drivers attention should have a warning label. Alcohol naturally makes you sleepy and on its own can cause accidents. Combined with excessive drowsiness its effects are amplified. Signs of Drowsy Driving:   * You don't remember driving the last few miles   * You may drift out of your cristhian   * You are unable to focus and your thoughts wander   * You may yawn more often than normal   * You have difficulty keeping your eyes open / nodding off   * Missing traffic signs, speeding, or tailgating  Prevention-   Good sleep hygiene, lifestyle and behavioral choices have the most impact on drowsy driving. There is no substitute for sleep and the average person requires 8 hours nightly. If you find yourself driving drowsy, stop and sleep. Consider the sleep hygiene tips provided during your visit as well. Medication Refill Policy: Refills for all medications require 1 week advance notice. Please have your pharmacy fax a refill request. We are unable to fax, or call in \"controled substance\" medications and you will need to pick these prescriptions up from our office. Inform Direct Activation    Thank you for requesting access to Inform Direct. Please follow the instructions below to securely access and download your online medical record. Inform Direct allows you to send messages to your doctor, view your test results, renew your prescriptions, schedule appointments, and more. How Do I Sign Up? 1. In your internet browser, go to [email or web address]. 2. Click on the First Time User? Click Here link in the Sign In box. You will see the New Member Sign Up page. 3. Enter your Inform Direct Access Code exactly as it appears below. You will not need to use this code after youve completed the sign-up process. If you do not sign up before the expiration date, you must request a new code.     Inform Direct Access Code: [email or web address]  Expires: 3/19/2022  2:48 PM (This is the date your basestone access code will )    4. Enter the last four digits of your Social Security Number (xxxx) and Date of Birth (mm/dd/yyyy) as indicated and click Submit. You will be taken to the next sign-up page. 5. Create a basestone ID. This will be your basestone login ID and cannot be changed, so think of one that is secure and easy to remember. 6. Create a basestone password. You can change your password at any time. 7. Enter your Password Reset Question and Answer. This can be used at a later time if you forget your password. 8. Enter your e-mail address. You will receive e-mail notification when new information is available in [address] 9. Click Sign Up. You can now view and download portions of your medical record. 10. Click the Download Summary menu link to download a portable copy of your medical information. Additional Information    If you have questions, please call [phone]. Remember, basestone is NOT to be used for urgent needs. For medical emergencies, dial 911.

## 2022-03-02 NOTE — PROGRESS NOTES
217 [facility]., [place]. [place], [address]  Tel.  [phone]  Fax. 100 [facility] 60  [place], [address] [facility]  Tel.  [phone]  Fax. [phone] [address]  Tel.  [phone]  Fax. 90 [facility] (: 1970) is a 45 y.o. male, established patient, seen for positive airway pressure follow-up and evaluation of the following chief complaint(s):   No chief complaint on file. Brie Joshi was last seen by me on 2017, prior notes reviewed in detail. Home Sleep Apnea Test (HSAT) performed on 10- showed an AHI of 7.2/hr with a lowest SpO2 of 77%, duration of SpO2 < 88% 21 min. ASSESSMENT/PLAN:    ICD-10-CM ICD-9-CM    1. ABUNDIO (obstructive sleep apnea)  G47.33 327.23        On ResMed:  AirSense 10 AutoSet      Settings:  CPAP: 12 cmH2O    EPR: 2    1. Sleep Apnea -   * He is adherent with PAP therapy and PAP continues to benefit patient and remains necessary for control of his sleep apnea. * He agrees to a trial of Bi-Level PAP Therapy due to CPAP intolerance / Failure. Orders Placed This Encounter    NOVEL CORONAVIRUS (COVID-19)     Standing Status:   Future     Number of Occurrences:   1     Standing Expiration Date:   2022     Scheduling Instructions:      1) Due to current limited availability of the COVID-19 PCR test, tests will be prioritized and may not be completed.              2) Order only if the test result will change clinical management or necessary for a return to mission-critical employment decision.              3) Print and instruct patient to adhere to [facility] home isolation program. (Link Above)              4) Set up or refer patient for a monitoring program.              5) Have patient sign up for and leverage [employer] (if not previously done).      Order Specific Question:   Status     Answer:   Asymptomatic/Surveillance(e.g. pre-op/pre-procedure/pre-delivery/transfer)     Order Specific Question:   Reason for Test     Answer:   Upcoming elective surgery/procedure/delivery, return to work, or discharge to another facility    SLEEP LAB (PAP TITRATION)     Standing Status:   Future     Standing Expiration Date:   6/2/2022     Scheduling Instructions:      Perform Bi-level Titration. Order Specific Question:   Reason for Exam     Answer:   ABUNDIO       * Counseling was provided regarding the importance of regular PAP use with emphasis on ensuring sufficient total sleep time, proper sleep hygiene, and safe driving. * Re-enforced proper and regular cleaning for the device. * He was asked to contact our office for any problems regarding PAP therapy. 2. Recommended a dedicated weight loss program through appropriate diet and exercise regimen as significant weight reduction has been shown to reduce severity of obstructive sleep apnea. Follow-up and Dispositions    · Return for follow-up with nurse practioner in 3 months. SUBJECTIVE/OBJECTIVE:    He  is seen today for follow up on PAP device and reports no problems using the device. The following concerns identified:    Drowsiness no Problems exhaling no   Snoring yes Forget to put on no   Mask Comfortable yes Can't fall asleep no   Dry Mouth no Mask falls off no   Air Leaking no Frequent awakenings yes       He admits that his sleep has improved on PAP therapy using FF mask and heated tubing. He reports of feeling SOB all day. He was hospitalized about 11/2 years previously for evaluation of SOB. Ejection fraction was estimated to be 55%. No other abnormality could be found on evaluation by cardiology or pulmology. He reports of taking advil pm to help with sleep onset and has been waking up 3-4 times per night for no specific reason. He reports of feeling better throughout the day, prior to getting on CPAP therapy he would feel wiped out during the day.  He reports of shortness of breath on exertion and while eating, this has been present for about three years. He was previously on APAP and was experiencing difficulty tolerating high pressure and was changed to fixed pressure therapy in October 2020. He denies of symptoms indicative of cataplexy, sleep paralysis or hypnagogic hallucinations. Review of device download indicated:    Usage Days >= 4 hours 100 %  Median Usage  8 hour 58 minutes    Device Pressure 12 cmH2O    Median Leak 8.6 L/min  95% Leak 13.4 L/min    Average AHI: 1.8 /hr which reflects improved sleep breathing condition. Western Sleepiness Score: 9   Modified F.O.S.Q. Score Total / 2: 15.5       Sleep Review of Systems: notable for Negative difficulty falling asleep; Positive awakenings at night; Negative  early morning headaches; Negative  memory problems; Negative  concentration issues; Negative  chest pain; Positive  shortness of breath;  Negative rashes or itching; Negative heartburn / belching / flatulence; Negative  significant mood issues; No afternoon naps per week. Visit Vitals  /75 (BP 1 Location: Right arm, BP Patient Position: Sitting, BP Cuff Size: Large adult)   Pulse 72   Temp 98.1 °F (36.7 °C) (Temporal)   Resp 20   Ht 6' 3\" (1.905 m)   Wt 246 lb 3.2 oz (111.7 kg)   SpO2 97%   BMI 30.77 kg/m²         General:   Not in acute distress   Eyes:  Anicteric sclerae, no obvious strabismus   Nose:  No obvious nasal septum deviation    Oropharynx:   Class 4 oropharyngeal outlet, thick tongue base, uvula not seen due to low-lying soft palate, narrow tonsilo-pharyngeal pilars   Tonsils:   tonsils are not visualized due to low-lying soft palate   Neck:   midline trachea   Chest/Lungs:  Equal lung expansion, clear on auscultation    CVS:  Normal rate, regular rhythm; no JVD   Skin:  Warm to touch; no obvious rashes   Neuro:  No focal deficits ; no obvious tremor    Psych:  Normal affect,  normal countenance;          An electronic signature was used to authenticate this note. Gianfranco Gray MD, FAASM  Electronically signed.  03/02/22

## 2022-03-17 ENCOUNTER — TELEPHONE (OUTPATIENT)
Dept: SLEEP MEDICINE | Age: 52
End: 2022-03-17

## 2022-06-28 ENCOUNTER — DOCUMENTATION ONLY (OUTPATIENT)
Dept: SLEEP MEDICINE | Age: 52
End: 2022-06-28

## 2022-06-28 NOTE — PROGRESS NOTES
Office notes and sleep study faxed to [facility], scanned into media.

## 2023-10-11 NOTE — TELEPHONE ENCOUNTER
Received fax from [address] [place] [address] asking for office notes, recent lipid labs, statin Rimma Lara. Faxed everything to [phone] and received fax confirmation. Medication teaching and assessment/Observation and assessment/Teaching and training